# Patient Record
Sex: FEMALE | Race: WHITE | HISPANIC OR LATINO | Employment: STUDENT | URBAN - METROPOLITAN AREA
[De-identification: names, ages, dates, MRNs, and addresses within clinical notes are randomized per-mention and may not be internally consistent; named-entity substitution may affect disease eponyms.]

---
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
## 2024-02-13 ENCOUNTER — TELEPHONE (OUTPATIENT)
Dept: PEDIATRICS | Facility: CLINIC | Age: 19
End: 2024-02-13

## 2024-02-13 ENCOUNTER — OFFICE VISIT (OUTPATIENT)
Dept: PEDIATRICS | Facility: CLINIC | Age: 19
End: 2024-02-13
Payer: COMMERCIAL

## 2024-02-13 VITALS
WEIGHT: 194.45 LBS | DIASTOLIC BLOOD PRESSURE: 75 MMHG | SYSTOLIC BLOOD PRESSURE: 108 MMHG | HEART RATE: 67 BPM | BODY MASS INDEX: 30.52 KG/M2 | HEIGHT: 67 IN

## 2024-02-13 DIAGNOSIS — R45.4 ANGER REACTION: ICD-10-CM

## 2024-02-13 DIAGNOSIS — N92.6 IRREGULAR MENSES: ICD-10-CM

## 2024-02-13 DIAGNOSIS — R73.03 PREDIABETES: ICD-10-CM

## 2024-02-13 DIAGNOSIS — L68.0 HIRSUTISM: ICD-10-CM

## 2024-02-13 DIAGNOSIS — Z60.4 SOCIAL ISOLATION: ICD-10-CM

## 2024-02-13 DIAGNOSIS — E66.811 OBESITY, CLASS I, BMI 30-34.9: Primary | ICD-10-CM

## 2024-02-13 PROCEDURE — 97803 MED NUTRITION INDIV SUBSEQ: CPT | Performed by: DIETITIAN, REGISTERED

## 2024-02-13 PROCEDURE — 99214 OFFICE O/P EST MOD 30 MIN: CPT | Performed by: NURSE PRACTITIONER

## 2024-02-13 RX ORDER — PHENTERMINE HYDROCHLORIDE 30 MG/1
30 CAPSULE ORAL EVERY MORNING
Qty: 30 CAPSULE | Refills: 2 | Status: SHIPPED | OUTPATIENT
Start: 2024-02-13

## 2024-02-13 RX ORDER — METFORMIN HCL 500 MG
500 TABLET, EXTENDED RELEASE 24 HR ORAL 2 TIMES DAILY WITH MEALS
Qty: 180 TABLET | Refills: 0 | Status: SHIPPED | OUTPATIENT
Start: 2024-02-13 | End: 2024-04-23

## 2024-02-13 SDOH — SOCIAL STABILITY - SOCIAL INSECURITY: SOCIAL EXCLUSION AND REJECTION: Z60.4

## 2024-02-13 NOTE — PATIENT INSTRUCTIONS
[facility]   Pediatric Specialty Clinic [place]      Pediatric Call Center Scheduling and Nurse Questions:  [phone]    After hours urgent matters that cannot wait until the next business day:  [phone].  Ask for the on-call pediatric doctor for the specialty you are calling for be paged.      Prescription Renewals:  Please call your pharmacy first.  Your pharmacy must fax requests to [phone].  Please allow 2-3 days for prescriptions to be authorized.    If your physician has ordered a CT or MRI, you may schedule this test by calling [facility] Radiology in [place] at [phone].        **If your child is having a sedated procedure, they will need a history and physical done at their Primary Care Provider within 30 days of the procedure.  If your child was seen by the ordering provider in our office within 30 days of the procedure, their visit summary will work for the H&P unless they inform you otherwise.  If you have any questions, please call the RN Care Coordinator.**

## 2024-02-13 NOTE — NURSING NOTE
"Chief Complaint   Patient presents with    RECHECK     Weight Management       /75 (BP Location: Right arm, Patient Position: Sitting, Cuff Size: Adult Large)   Pulse 67   Ht 1.692 m (5' 6.61\")   Wt 88.2 kg (194 lb 7.1 oz)   BMI 30.81 kg/m      I have Reviewed the patients medications and allergies.    I did not ask about flu vaccine today.    Anmol Moreau, SAMANTA  February 13, 2024    "

## 2024-02-13 NOTE — TELEPHONE ENCOUNTER
PA Initiation    Medication: ZEPBOUND 2.5 MG/0.5ML SC SOAJ  Insurance Company: [employer] - Phone [phone] Fax [phone]  Pharmacy Filling the Rx:    Filling Pharmacy Phone:    Filling Pharmacy Fax:    Start Date: 2/13/2024    Key: [id number]

## 2024-02-13 NOTE — LETTER
2024      RE: Ismael Pepper  [address]  [place] [facility] [address]     Dear Colleague,    Thank you for referring your patient, Ismael Pepper, to the [facility] [place]. Please see a copy of my visit note below.    Date: 2024    PATIENT:  Ismael Pepper  :          2005  CELESTINA:          2024    Dear Roverto Ramírez:    I had the pleasure of seeing your patient, Ismael Pepper, for a follow-up visit in the Pediatric Weight Management Clinic on 2024 at the [facility].  Ismael was last seen in this clinic 2023.  Please see below for my assessment and plan of care.    Intercurrent History:    Ismael was arrived to this appointment by herself.  As you may recall, Ismael is a 18 year old girl with history of class I obesity, sleep onset difficulties, high risk for diabetes and social isolation.  Since Ismael's last visit, Ismael has maintained stable weight. Ismael is in a good place mentally and emotionally.    Ismael is busy with work. She continues work in a  center. Ismael is generally to herself and not very social with friends. Her best friend is leaving for a  year and a half for mission trip and this does give her some anxiety that she will be more isolated.    Ismael has been taking phentermine and metformin. Some days she thinks phentermine is not that effective. Ismael has been taking phentermine for quite some time.      Current Medications:    Current Outpatient Rx   Medication Sig Dispense Refill    ferrous sulfate (FEROSUL) 325 (65 Fe) MG tablet Take 1 tablet (325 mg) by mouth daily (with breakfast) 30 tablet 11    phentermine (ADIPEX-P) 30 MG capsule Take 1 capsule (30 mg) by mouth every morning 30 capsule 2    spironolactone (ALDACTONE) 50 MG tablet TAKE 2 TABLETS BY MOUTH DAILY 180 tablet 1    topiramate (TOPAMAX) 100 MG tablet Take 1 tablet (100 mg) by mouth at  "bedtime for 90 days 90 tablet 0    tretinoin (RETIN-A) 0.05 % external cream Apply small amount to acne prone areas on forehead at bedtime as directed 45 g 1    triamcinolone (KENALOG) 0.1 % external ointment Apply topically 2 times daily To rashes on elbows until resolved 30 g 3    vitamin D3 (CHOLECALCIFEROL) 1.25 MG (96063 UT) capsule Take 1 capsule (50,000 Units) by mouth every 7 days 12 capsule 0    metFORMIN (GLUCOPHAGE XR) 500 MG 24 hr tablet Take 1 tablet (500 mg) by mouth 2 times daily (with meals) for 90 days 180 tablet 0       Physical Exam:    Vitals:  B/P: 108/75, P: 67, R: Data Unavailable   BP:  Blood pressure %suzy are not available for patients who are 18 years or older.    Measured Weights:  Wt Readings from Last 4 Encounters:   02/13/24 88.2 kg (194 lb 7.1 oz) (97%, Z= 1.85)*   12/12/23 89 kg (196 lb 3.4 oz) (97%, Z= 1.89)*   12/12/23 89 kg (196 lb 3.4 oz) (97%, Z= 1.89)*   11/27/23 87.5 kg (192 lb 14.4 oz) (97%, Z= 1.83)*     * Growth percentiles are based on CDC (Girls, 2-20 Years) data.       Height:    Ht Readings from Last 4 Encounters:   02/13/24 1.692 m (5' 6.61\") (82%, Z= 0.91)*   12/12/23 1.69 m (5' 6.54\") (81%, Z= 0.88)*   12/12/23 1.69 m (5' 6.54\") (81%, Z= 0.88)*   11/27/23 1.69 m (5' 6.54\") (81%, Z= 0.88)*     * Growth percentiles are based on CDC (Girls, 2-20 Years) data.       Body Mass Index:  Body mass index is 30.81 kg/m .  Body Mass Index Percentile:  94 %ile (Z= 1.58) based on CDC (Girls, 2-20 Years) BMI-for-age based on BMI available as of 2/13/2024.       Labs:  None today.    Assessment:      Ismael is a 18 year old female with a BMI in the obese category and at risk for weight related co-morbid illness. Today, we discussed continuing current medications. I advised her to go off phentermine for about 10 days. Tolerance develops with phentermine. Stopping for a period of time then restarting may help regain appetite suppression. Ismael would like to consider injectable " medication again. We will add a GLP1-GIP agonist medication Zepbound. GLP1 agonists have been approved for patients 12 and over for the treatment of obesity. In both clinical trials and clinical practice, Juwan has shown dramatic improvement in BMI. We reviewed dosing instructions, benefits/expected outcomes of treatment and possible side-effects. No one in Ismael family has had medullary thyroid cancer or MENS2. Ismael will maintain physical activity by participating in home exercise plan and work which is physical in nature. Ismael will continue regular visits here with me and the dietitian who provides the patient with a reduced calorie diet.  Ismael is not pregnant and has no risk of pregnancy.          I spent a total of 30 minutes on date of encounter face to face with Ismael and family, more than 50% of which was spent in counseling and coordination of care so as to minimize the development and/or progression of obesity related co-morbid conditions.     Ismael s current problem list reviewed today includes:    Encounter Diagnoses   Name Primary?    BMI (body mass index), pediatric, greater than 99% for age     Prediabetes     Hirsutism     Irregular menses     Social isolation     Anger reaction         Care Plan:    Using motivational interviewing, Ismael made the following goals:  Patient Instructions   GOALS  Try matcha vanilla tea latte at [employer]/consider less often/smaller size  Consider bringing a small treat in lunch bag rather than going out for a coffee/tea drink.   Try to find a way to have vegetables once per day as a start.     Hold phentermine for about 10 days, then restart.  Prescription sent for Zepbound.    I am looking forward to seeing Ismael for a follow-up visit in 10 weeks.    Thank you for including me in the care of your patient.  Please do not hesitate to call with questions or concerns.    Sincerely,    Tequila Nunez RN, CPNP  Department of Pediatrics  Pediatric Obesity and Weight  Management Clinic  [facility]      [facility] [phone]  [facility]ty [facility] [place] [phone]    Copy to patient  Kassi Bourne,Manual  [address]  [facility] [address]

## 2024-02-13 NOTE — LETTER
"  2024      RE: Ismael Pepper  [address] [facility] [place] [address]     Dear Colleague,    Thank you for referring your patient, Ismael Pepper, to the [facility]ATR[facility] [place]. Please see a copy of my visit note below.    PATIENT:  Ismael Pepper  :  2005  CELESTINA:  2024  Medical Nutrition Therapy    GOALS  Try matcha vanilla tea latte at [employer]/consider less often/smaller size  Consider bringing a small treat in lunch bag rather than going out for a coffee/tea drink.   Try to find a way to have vegetables once per day as a start.          Nutrition Reassessment  Ismael is a 18 year old year old female who presents to Pediatric Weight Management Clinic with class 1 obesity and prediabetes. Ismael was referred by ANNE Gutierrez, CNP for nutrition education and counseling.    Anthropometrics  Wt Readings from Last 4 Encounters:   23 89 kg (196 lb 3.4 oz) (97%, Z= 1.89)*   23 89 kg (196 lb 3.4 oz) (97%, Z= 1.89)*   23 87.5 kg (192 lb 14.4 oz) (97%, Z= 1.83)*   10/03/23 88 kg (194 lb 0.1 oz) (97%, Z= 1.86)*     * Growth percentiles are based on CDC (Girls, 2-20 Years) data.     Ht Readings from Last 2 Encounters:   23 1.69 m (5' 6.54\") (81%, Z= 0.88)*   23 1.69 m (5' 6.54\") (81%, Z= 0.88)*     * Growth percentiles are based on CDC (Girls, 2-20 Years) data.     Estimated body mass index is 31.16 kg/m  as calculated from the following:    Height as of 23: 1.69 m (5' 6.54\").    Weight as of 23: 89 kg (196 lb 3.4 oz).    Nutrition History  Sometimes has breakfast before work. Will sometimes have eggs with ham. Yesterday she had crepes at home. Mom makes breakfast. Eats breakfast 2-3 times per week.     No morning snack. Drinking water throughout the day.     Will eat lunch during the week days. Packs lunch during the week. Quesadilla with barbacoa and usually bringing fruit. Not as much yogurt. Typically brings leftovers " from what mom makes. Will bring chicken tinga with 1 corn tortilla. Will bring cuties, grapes, strawberries, pineapple.     More of a sweet craving in afternoon - caribou medium matcha bubble tea. Used to have daily chips at work (1 1/2 handfuls). Feeling less hungry no longer craving this as much.     On weekends will go to sleep around 1 am. Waking up around 10 am. Going to sleep between 930-11 pm on work nights. Work shift starts at 9 am.      Protein she likes include: chicken, beef, eggs, nuts (no peanut butter), no seafood  Dairy she likes include: yogurt, milk (doesn't drink often would have a glass with Mexican sweet bread), cheese, cottage cheese is ok  Vegetables she likes include: zucchini, carrots (sometimes), cucumbers (occasionally), salad (would eat it), no to tomatoes, corn, sweet peppers  Fruit she enjoys include: strawberries, bernice, pineapple, banana (sometimes), apples    Nutritional Intakes  Breakfast:        Egg with ham or crepes. 2-3 days per week having breakfast. Water   AM Snack:       None  Lunch:             Barbacoa quesadilla and fruit; caldo de res; tinga with 1 corn tortilla; water  PM Snack:      Snack - 1 1/2 handful potato chips at work (less often); 3-4 days per week medium Ada matcha bubble tea with whip  Dinner:            Carne asada, jennifer, tacos, grilled chicken and rice  HS Snack:       None  Beverages:      Water, matcha tea 3-4 days per week            Dining Out  Ismael eating out less often. Goes out with family on weekends. Sometimes if they don't have food at home she would get [employer] queen salad or [employer] (plate with chow mein, honey sesame chicken and teriyaki chicken with water). Sometimes she saves half for at home or she will eat the whole thing.     Physical Activity   No longer has a treadmill at home. Active with job. [employer] at home. Interested in trying barre, pilates or yoga at home.      Previous Goals & Progress  1) Try to include  vegetables in the evening - most days. - ongoing goal   2) In the evening, spend time with dad/family before snack and then once he starts to snack can go do something else to avoid temptation or grab an herbal tea, water, gum. - goal not addressed  3) Check out beginner yoga, barre or pilates on YouTube. - goal not addressed    Medications/Vitamins/Minerals    Current Outpatient Medications:      ferrous sulfate (FEROSUL) 325 (65 Fe) MG tablet, Take 1 tablet (325 mg) by mouth daily (with breakfast), Disp: 30 tablet, Rfl: 11     metFORMIN (GLUCOPHAGE XR) 500 MG 24 hr tablet, Take 1 tablet (500 mg) by mouth 2 times daily (with meals) for 90 days, Disp: 180 tablet, Rfl: 0     phentermine (ADIPEX-P) 30 MG capsule, Take 1 capsule (30 mg) by mouth every morning, Disp: 30 capsule, Rfl: 2     spironolactone (ALDACTONE) 50 MG tablet, TAKE 2 TABLETS BY MOUTH DAILY, Disp: 180 tablet, Rfl: 1     topiramate (TOPAMAX) 100 MG tablet, Take 1 tablet (100 mg) by mouth at bedtime for 90 days, Disp: 90 tablet, Rfl: 0     tretinoin (RETIN-A) 0.05 % external cream, Apply small amount to acne prone areas on forehead at bedtime as directed, Disp: 45 g, Rfl: 1     triamcinolone (KENALOG) 0.1 % external ointment, Apply topically 2 times daily To rashes on elbows until resolved, Disp: 30 g, Rfl: 3     vitamin D3 (CHOLECALCIFEROL) 1.25 MG (69030 UT) capsule, Take 1 capsule (50,000 Units) by mouth every 7 days, Disp: 12 capsule, Rfl: 0    Nutrition Diagnosis  Obesity related to excessive energy intake as evidenced by BMI/age >95th %ile    Interventions & Education  Provided written and verbal education on the following:    Healthy beverages  Increase fruit and vegetable intake    Monitoring/Evaluation  Will continue to monitor progress towards goals and provide education in Pediatric Weight Management.    Spent 25 minutes in consult with patient.      Again, thank you for allowing me to participate in the care of your patient.       Sincerely,    Magdalena Hoover RD

## 2024-02-13 NOTE — PROGRESS NOTES
Date: 2024    PATIENT:  Ismael Pepper  :          2005  CELESTINA:          2024    Dear Roverto Ramírez:    I had the pleasure of seeing your patient, Ismael Pepper, for a follow-up visit in the Pediatric Weight Management Clinic on 2024 at the [facility].  Ismael was last seen in this clinic 2023.  Please see below for my assessment and plan of care.    Intercurrent History:    Ismael was arrived to this appointment by herself.  As you may recall, Ismael is a 18 year old girl with history of class I obesity, sleep onset difficulties, high risk for diabetes and social isolation.  Since Ismael's last visit, Ismael has maintained stable weight. Ismael is in a good place mentally and emotionally.    Ismael is busy with work. She continues work in a  center. Ismael is generally to herself and not very social with friends. Her best friend is leaving for a  year and a half for mission trip and this does give her some anxiety that she will be more isolated.    Ismael has been taking phentermine and metformin. Some days she thinks phentermine is not that effective. Ismael has been taking phentermine for quite some time.      Current Medications:    Current Outpatient Rx   Medication Sig Dispense Refill    ferrous sulfate (FEROSUL) 325 (65 Fe) MG tablet Take 1 tablet (325 mg) by mouth daily (with breakfast) 30 tablet 11    phentermine (ADIPEX-P) 30 MG capsule Take 1 capsule (30 mg) by mouth every morning 30 capsule 2    spironolactone (ALDACTONE) 50 MG tablet TAKE 2 TABLETS BY MOUTH DAILY 180 tablet 1    topiramate (TOPAMAX) 100 MG tablet Take 1 tablet (100 mg) by mouth at bedtime for 90 days 90 tablet 0    tretinoin (RETIN-A) 0.05 % external cream Apply small amount to acne prone areas on forehead at bedtime as directed 45 g 1    triamcinolone (KENALOG) 0.1 % external ointment Apply topically 2 times daily To rashes on elbows until  "resolved 30 g 3    vitamin D3 (CHOLECALCIFEROL) 1.25 MG (35022 UT) capsule Take 1 capsule (50,000 Units) by mouth every 7 days 12 capsule 0    metFORMIN (GLUCOPHAGE XR) 500 MG 24 hr tablet Take 1 tablet (500 mg) by mouth 2 times daily (with meals) for 90 days 180 tablet 0       Physical Exam:    Vitals:  B/P: 108/75, P: 67, R: Data Unavailable   BP:  Blood pressure %suzy are not available for patients who are 18 years or older.    Measured Weights:  Wt Readings from Last 4 Encounters:   02/13/24 88.2 kg (194 lb 7.1 oz) (97%, Z= 1.85)*   12/12/23 89 kg (196 lb 3.4 oz) (97%, Z= 1.89)*   12/12/23 89 kg (196 lb 3.4 oz) (97%, Z= 1.89)*   11/27/23 87.5 kg (192 lb 14.4 oz) (97%, Z= 1.83)*     * Growth percentiles are based on CDC (Girls, 2-20 Years) data.       Height:    Ht Readings from Last 4 Encounters:   02/13/24 1.692 m (5' 6.61\") (82%, Z= 0.91)*   12/12/23 1.69 m (5' 6.54\") (81%, Z= 0.88)*   12/12/23 1.69 m (5' 6.54\") (81%, Z= 0.88)*   11/27/23 1.69 m (5' 6.54\") (81%, Z= 0.88)*     * Growth percentiles are based on CDC (Girls, 2-20 Years) data.       Body Mass Index:  Body mass index is 30.81 kg/m .  Body Mass Index Percentile:  94 %ile (Z= 1.58) based on CDC (Girls, 2-20 Years) BMI-for-age based on BMI available as of 2/13/2024.       Labs:  None today.    Assessment:      Ismael is a 18 year old female with a BMI in the obese category and at risk for weight related co-morbid illness. Today, we discussed continuing current medications. I advised her to go off phentermine for about 10 days. Tolerance develops with phentermine. Stopping for a period of time then restarting may help regain appetite suppression. Ismael would like to consider injectable medication again. We will add a GLP1-GIP agonist medication Zepbound. GLP1 agonists have been approved for patients 12 and over for the treatment of obesity. In both clinical trials and clinical practice, Wegovy has shown dramatic improvement in BMI. We reviewed dosing " instructions, benefits/expected outcomes of treatment and possible side-effects. No one in Ismael family has had medullary thyroid cancer or MENS2. Ismael will maintain physical activity by participating in home exercise plan and work which is physical in nature. Ismael will continue regular visits here with me and the dietitian who provides the patient with a reduced calorie diet.  Ismael is not pregnant and has no risk of pregnancy.          I spent a total of 30 minutes on date of encounter face to face with Ismael and family, more than 50% of which was spent in counseling and coordination of care so as to minimize the development and/or progression of obesity related co-morbid conditions.     Ismael s current problem list reviewed today includes:    Encounter Diagnoses   Name Primary?    BMI (body mass index), pediatric, greater than 99% for age     Prediabetes     Hirsutism     Irregular menses     Social isolation     Anger reaction         Care Plan:    Using motivational interviewing, Ismael made the following goals:  Patient Instructions   GOALS  Try matcha vanilla tea latte at [place]/consider less often/smaller size  Consider bringing a small treat in lunch bag rather than going out for a coffee/tea drink.   Try to find a way to have vegetables once per day as a start.     Hold phentermine for about 10 days, then restart.  Prescription sent for Zepbound.    I am looking forward to seeing Ismael for a follow-up visit in 10 weeks.    Thank you for including me in the care of your patient.  Please do not hesitate to call with questions or concerns.    Sincerely,    Tequila Nunez RN, CPNP  Department of Pediatrics  Pediatric Obesity and Weight Management Clinic  [facility] [phone]  Specialty [facility] [place] [phone]      CC  Copy to patient  BourneKassi,Manual  [address]  [facility] [address]

## 2024-02-13 NOTE — PATIENT INSTRUCTIONS
GOALS  Try matcha vanilla tea latte at [place]/consider less often/smaller size  Consider bringing a small treat in lunch bag rather than going out for a coffee/tea drink.   Try to find a way to have vegetables once per day as a start.

## 2024-02-13 NOTE — PROGRESS NOTES
"PATIENT:  Ismael Pepper  :  2005  CELESTINA:  2024  Medical Nutrition Therapy    GOALS  Try matcha vanilla tea latte at [place]/consider less often/smaller size  Consider bringing a small treat in lunch bag rather than going out for a coffee/tea drink.   Try to find a way to have vegetables once per day as a start.          Nutrition Reassessment  Ismael is a 18 year old year old female who presents to Pediatric Weight Management Clinic with class 1 obesity and prediabetes. Ismael was referred by ANNE Gutierrez, CNP for nutrition education and counseling.    Anthropometrics  Wt Readings from Last 4 Encounters:   23 89 kg (196 lb 3.4 oz) (97%, Z= 1.89)*   23 89 kg (196 lb 3.4 oz) (97%, Z= 1.89)*   23 87.5 kg (192 lb 14.4 oz) (97%, Z= 1.83)*   10/03/23 88 kg (194 lb 0.1 oz) (97%, Z= 1.86)*     * Growth percentiles are based on CDC (Girls, 2-20 Years) data.     Ht Readings from Last 2 Encounters:   23 1.69 m (5' 6.54\") (81%, Z= 0.88)*   23 1.69 m (5' 6.54\") (81%, Z= 0.88)*     * Growth percentiles are based on CDC (Girls, 2-20 Years) data.     Estimated body mass index is 31.16 kg/m  as calculated from the following:    Height as of 23: 1.69 m (5' 6.54\").    Weight as of 23: 89 kg (196 lb 3.4 oz).    Nutrition History  Sometimes has breakfast before work. Will sometimes have eggs with ham. Yesterday she had crepes at home. Mom makes breakfast. Eats breakfast 2-3 times per week.     No morning snack. Drinking water throughout the day.     Will eat lunch during the week days. Packs lunch during the week. Quesadilla with barbacoa and usually bringing fruit. Not as much yogurt. Typically brings leftovers from what mom makes. Will bring chicken tinga with 1 corn tortilla. Will bring cuties, grapes, strawberries, pineapple.     More of a sweet craving in afternoon - caribou medium matcha bubble tea. Used to have daily chips at work (1 1/2 handfuls). Feeling less " hungry no longer craving this as much.     On weekends will go to sleep around 1 am. Waking up around 10 am. Going to sleep between 930-11 pm on work nights. Work shift starts at 9 am.      Protein she likes include: chicken, beef, eggs, nuts (no peanut butter), no seafood  Dairy she likes include: yogurt, milk (doesn't drink often would have a glass with Mexican sweet bread), cheese, cottage cheese is ok  Vegetables she likes include: zucchini, carrots (sometimes), cucumbers (occasionally), salad (would eat it), no to tomatoes, corn, sweet peppers  Fruit she enjoys include: strawberries, bernice, pineapple, banana (sometimes), apples    Nutritional Intakes  Breakfast:        Egg with ham or crepes. 2-3 days per week having breakfast. Water   AM Snack:       None  Lunch:             Barbacoa quesadilla and fruit; caldo de res; tinga with 1 corn tortilla; water  PM Snack:      Snack - 1 1/2 handful potato chips at work (less often); 3-4 days per week medium [place] matcha bubble tea with whip  Dinner:            Carne asada, jennifer, tacos, grilled chicken and rice  HS Snack:       None  Beverages:      Water, matcha tea 3-4 days per week            Dining Out  Ismael eating out less often. Goes out with family on weekends. Sometimes if they don't have food at home she would get Chick[employer]Chandrak[employer] queen salad or [employer] (plate with chow mein, honey sesame chicken and teriyaki chicken with water). Sometimes she saves half for at home or she will eat the whole thing.     Physical Activity   No longer has a treadmill at home. Active with job. [employer] at home. Interested in trying barre, pilates or yoga at home.      Previous Goals & Progress  1) Try to include vegetables in the evening - most days. - ongoing goal   2) In the evening, spend time with dad/family before snack and then once he starts to snack can go do something else to avoid temptation or grab an herbal tea, water, gum. - goal not addressed  3) Check out  beginner yoga, barre or pilates on YouTube. - goal not addressed    Medications/Vitamins/Minerals    Current Outpatient Medications:     ferrous sulfate (FEROSUL) 325 (65 Fe) MG tablet, Take 1 tablet (325 mg) by mouth daily (with breakfast), Disp: 30 tablet, Rfl: 11    metFORMIN (GLUCOPHAGE XR) 500 MG 24 hr tablet, Take 1 tablet (500 mg) by mouth 2 times daily (with meals) for 90 days, Disp: 180 tablet, Rfl: 0    phentermine (ADIPEX-P) 30 MG capsule, Take 1 capsule (30 mg) by mouth every morning, Disp: 30 capsule, Rfl: 2    spironolactone (ALDACTONE) 50 MG tablet, TAKE 2 TABLETS BY MOUTH DAILY, Disp: 180 tablet, Rfl: 1    topiramate (TOPAMAX) 100 MG tablet, Take 1 tablet (100 mg) by mouth at bedtime for 90 days, Disp: 90 tablet, Rfl: 0    tretinoin (RETIN-A) 0.05 % external cream, Apply small amount to acne prone areas on forehead at bedtime as directed, Disp: 45 g, Rfl: 1    triamcinolone (KENALOG) 0.1 % external ointment, Apply topically 2 times daily To rashes on elbows until resolved, Disp: 30 g, Rfl: 3    vitamin D3 (CHOLECALCIFEROL) 1.25 MG (77899 UT) capsule, Take 1 capsule (50,000 Units) by mouth every 7 days, Disp: 12 capsule, Rfl: 0    Nutrition Diagnosis  Obesity related to excessive energy intake as evidenced by BMI/age >95th %ile    Interventions & Education  Provided written and verbal education on the following:    Healthy beverages  Increase fruit and vegetable intake    Monitoring/Evaluation  Will continue to monitor progress towards goals and provide education in Pediatric Weight Management.    Spent 25 minutes in consult with patient.

## 2024-02-14 NOTE — TELEPHONE ENCOUNTER
Prior Authorization Approval    Medication: ZEPBOUND 2.5 MG/0.5ML SC SOAJ  Authorization Effective Date: 2/14/2024  Authorization Expiration Date: 8/14/2024  Approved Dose/Quantity: 2ml/28 days   Reference #: [id number]   Insurance Company: [employer] - Phone [phone] Fax [phone]  Expected CoPay: $ 0  CoPay Card Available:      Financial Assistance Needed: no  Which Pharmacy is filling the prescription:    Pharmacy Notified: no  Patient Notified: pharmacy will notify patient

## 2024-02-25 ENCOUNTER — HEALTH MAINTENANCE LETTER (OUTPATIENT)
Age: 19
End: 2024-02-25

## 2024-04-23 ENCOUNTER — OFFICE VISIT (OUTPATIENT)
Dept: PEDIATRICS | Facility: CLINIC | Age: 19
End: 2024-04-23
Payer: COMMERCIAL

## 2024-04-23 VITALS
WEIGHT: 202.38 LBS | BODY MASS INDEX: 31.76 KG/M2 | HEIGHT: 67 IN | HEART RATE: 65 BPM | DIASTOLIC BLOOD PRESSURE: 76 MMHG | SYSTOLIC BLOOD PRESSURE: 126 MMHG

## 2024-04-23 VITALS
BODY MASS INDEX: 31.76 KG/M2 | WEIGHT: 202.38 LBS | HEART RATE: 65 BPM | HEIGHT: 67 IN | SYSTOLIC BLOOD PRESSURE: 126 MMHG | DIASTOLIC BLOOD PRESSURE: 76 MMHG

## 2024-04-23 DIAGNOSIS — R45.4 ANGER REACTION: ICD-10-CM

## 2024-04-23 DIAGNOSIS — E66.811 OBESITY, CLASS I, BMI 30-34.9: Primary | ICD-10-CM

## 2024-04-23 DIAGNOSIS — N92.6 IRREGULAR MENSES: ICD-10-CM

## 2024-04-23 DIAGNOSIS — L68.0 HIRSUTISM: ICD-10-CM

## 2024-04-23 DIAGNOSIS — L64.9 ANDROGENIC ALOPECIA: ICD-10-CM

## 2024-04-23 DIAGNOSIS — R73.03 PREDIABETES: ICD-10-CM

## 2024-04-23 DIAGNOSIS — E55.9 VITAMIN D DEFICIENCY: ICD-10-CM

## 2024-04-23 DIAGNOSIS — Z01.01 FAILED VISION SCREEN: ICD-10-CM

## 2024-04-23 DIAGNOSIS — Z60.4 SOCIAL ISOLATION: ICD-10-CM

## 2024-04-23 PROCEDURE — 99214 OFFICE O/P EST MOD 30 MIN: CPT | Performed by: NURSE PRACTITIONER

## 2024-04-23 PROCEDURE — 85246 CLOT FACTOR VIII VW ANTIGEN: CPT | Mod: 90 | Performed by: NURSE PRACTITIONER

## 2024-04-23 PROCEDURE — 36415 COLL VENOUS BLD VENIPUNCTURE: CPT | Performed by: NURSE PRACTITIONER

## 2024-04-23 PROCEDURE — 85245 CLOT FACTOR VIII VW RISTOCTN: CPT | Mod: 90 | Performed by: NURSE PRACTITIONER

## 2024-04-23 PROCEDURE — 99000 SPECIMEN HANDLING OFFICE-LAB: CPT | Performed by: NURSE PRACTITIONER

## 2024-04-23 PROCEDURE — 97803 MED NUTRITION INDIV SUBSEQ: CPT | Performed by: DIETITIAN, REGISTERED

## 2024-04-23 PROCEDURE — 85390 FIBRINOLYSINS SCREEN I&R: CPT | Performed by: PATHOLOGY

## 2024-04-23 PROCEDURE — 85247 CLOT FACTOR VIII MULTIMETRIC: CPT | Mod: 90 | Performed by: NURSE PRACTITIONER

## 2024-04-23 PROCEDURE — 85240 CLOT FACTOR VIII AHG 1 STAGE: CPT | Mod: 90 | Performed by: NURSE PRACTITIONER

## 2024-04-23 RX ORDER — METFORMIN HCL 500 MG
500 TABLET, EXTENDED RELEASE 24 HR ORAL
Qty: 180 TABLET | Refills: 0 | Status: SHIPPED | OUTPATIENT
Start: 2024-04-23 | End: 2024-06-17

## 2024-04-23 SDOH — SOCIAL STABILITY - SOCIAL INSECURITY: SOCIAL EXCLUSION AND REJECTION: Z60.4

## 2024-04-23 ASSESSMENT — PAIN SCALES - GENERAL
PAINLEVEL: NO PAIN (0)
PAINLEVEL: NO PAIN (0)

## 2024-04-23 NOTE — PATIENT INSTRUCTIONS
GOALS  Set alarm for morning metformin at 800 am.  Try the  Mitchell's Sparkling Auguste with no added sugar/sweetener.   In the morning, try to have some sort of protein: boiled eggs with fruit, Greek yogurt or cottage cheese with fruit, small handful of nuts with fruit, avocado toast with everything bagel seasoning +/- egg  Try to increase physical activity. Go for walks or use your stair stepper, biking etc. Start with 3 days per week.    [facility]   Pediatric Specialty Clinic [place]      Pediatric Call Center Scheduling and Nurse Questions:  [phone]    After hours urgent matters that cannot wait until the next business day:  [phone].  Ask for the on-call pediatric doctor for the specialty you are calling for be paged.      Prescription Renewals:  Please call your pharmacy first.  Your pharmacy must fax requests to [phone].  Please allow 2-3 days for prescriptions to be authorized.    If your physician has ordered a CT or MRI, you may schedule this test by calling [facility] in [place] at [phone].        **If your child is having a sedated procedure, they will need a history and physical done at their Primary Care Provider within 30 days of the procedure.  If your child was seen by the ordering provider in our office within 30 days of the procedure, their visit summary will work for the H&P unless they inform you otherwise.  If you have any questions, please call the RN Care Coordinator.**

## 2024-04-23 NOTE — LETTER
2024      RE: Ismael Pepper  [address]  [facility] [address]     Dear Colleague,    Thank you for referring your patient, Ismael Pepper, to the [facility] [place]. Please see a copy of my visit note below.    Date: 2024    PATIENT:  Ismael Pepper  :          2005  CELESTINA:          2024    Dear Roverto Ramírez:    I had the pleasure of seeing your patient, Ismael Pepper, for a follow-up visit in the Pediatric Weight Management Clinic on 2024 at the [facility].  Ismael was last seen in this clinic 2024.  Please see below for my assessment and plan of care.    Intercurrent History:    Ismael was arrive to this appointment by herself.  As you may recall, Ismael is a 18 year old girl with history of class I obesity, PCOS symptoms and high risk for diabetes.  Since Ismael last visit, Ismael has gained 8 pounds. She is tearful in clinic today about weight gain. Ismael had started Zepbound and was getting some bruising at injection site and then stopped. Ismael has a hard time with actually doing the injections and gets quite nervous about it. She is trying to be better with taking oral medication (metformin).      Current Medications:    Current Outpatient Rx   Medication Sig Dispense Refill     ferrous sulfate (FEROSUL) 325 (65 Fe) MG tablet Take 1 tablet (325 mg) by mouth daily (with breakfast) 30 tablet 11     metFORMIN (GLUCOPHAGE XR) 500 MG 24 hr tablet Take 1 tablet (500 mg) by mouth 2 times daily (with meals) 180 tablet 0     phentermine 30 MG capsule Take 1 capsule (30 mg) by mouth every morning 30 capsule 2     spironolactone (ALDACTONE) 50 MG tablet TAKE 2 TABLETS BY MOUTH DAILY 180 tablet 1     topiramate (TOPAMAX) 100 MG tablet Take 1 tablet (100 mg) by mouth at bedtime for 90 days 90 tablet 0     tretinoin (RETIN-A) 0.05 % external cream Apply small amount to acne prone  "areas on forehead at bedtime as directed 45 g 1     triamcinolone (KENALOG) 0.1 % external ointment Apply topically 2 times daily To rashes on elbows until resolved 30 g 3     vitamin D3 (CHOLECALCIFEROL) 1.25 MG (65714 UT) capsule Take 1 capsule (50,000 Units) by mouth every 7 days 12 capsule 0       Physical Exam:    Vitals:  B/P: 126/76, P: 65, R: Data Unavailable   BP:  Blood pressure %suzy are not available for patients who are 18 years or older.    Measured Weights:  Wt Readings from Last 4 Encounters:   04/23/24 91.8 kg (202 lb 6.1 oz) (98%, Z= 1.98)*   04/23/24 91.8 kg (202 lb 6.1 oz) (98%, Z= 1.98)*   02/13/24 88.2 kg (194 lb 7.1 oz) (97%, Z= 1.85)*   12/12/23 89 kg (196 lb 3.4 oz) (97%, Z= 1.89)*     * Growth percentiles are based on CDC (Girls, 2-20 Years) data.       Height:    Ht Readings from Last 4 Encounters:   04/23/24 1.692 m (5' 6.61\") (82%, Z= 0.91)*   04/23/24 1.692 m (5' 6.61\") (82%, Z= 0.91)*   02/13/24 1.692 m (5' 6.61\") (82%, Z= 0.91)*   12/12/23 1.69 m (5' 6.54\") (81%, Z= 0.88)*     * Growth percentiles are based on CDC (Girls, 2-20 Years) data.       Body Mass Index:  Body mass index is 32.07 kg/m .  Body Mass Index Percentile:  95 %ile (Z= 1.66) based on CDC (Girls, 2-20 Years) BMI-for-age based on BMI available as of 4/23/2024.       Labs:  Pending    Assessment:      Ismael is a 18 year old female with a BMI in the obese category and at risk for weight related co-morbid illness. Today, we discussed checking Von Willebrand panel to rule out any clotting issues. In addition to bruising, Ismael also has epistaxis and bruises easy elsewhere on her body in non-typical places.    I encouraged her to continue healthy choices and take metformin. If she wants to restart a GLP1, we can discuss at future visit.       I spent a total of 30 minutes on date of encounter face to face with Ismael and family, more than 50% of which was spent in counseling and coordination of care so as to minimize the " development and/or progression of obesity related co-morbid conditions.     Ismael s current problem list reviewed today includes:    Encounter Diagnoses   Name Primary?     BMI (body mass index), pediatric, greater than 99% for age Yes     Vitamin D deficiency      Prediabetes      Hirsutism      Androgenic alopecia      Irregular menses      Social isolation      Anger reaction      Failed vision screen         Care Plan:    Using motivational interviewing, Ismael made the following goals:  Continue metformin.  Try food log to monitor calories.      I am looking forward to seeing Ismael for a follow-up visit in 3 months.    Thank you for including me in the care of your patient.  Please do not hesitate to call with questions or concerns.    Sincerely,    Tequila Nunez, RN, CPNP  Department of Pediatrics  Pediatric Obesity and Weight Management Clinic  [facility] [phone]  [facility], [place] [phone]      CC  Copy to patient  Kassi Bourne,Manual  [address]  [facility] [address]

## 2024-04-23 NOTE — NURSING NOTE
"[facility] [[id number]]  Chief Complaint   Patient presents with    Nutrition Counseling     Initial /76   Pulse 65   Ht 1.692 m (5' 6.61\")   Wt 91.8 kg (202 lb 6.1 oz)   BMI 32.07 kg/m   Estimated body mass index is 32.07 kg/m  as calculated from the following:    Height as of this encounter: 1.692 m (5' 6.61\").    Weight as of this encounter: 91.8 kg (202 lb 6.1 oz).  Medication Reconciliation: complete      Does the patient want a flu shot today? No          "

## 2024-04-23 NOTE — LETTER
"  2024      RE: Ismael Pepper  [address] [place] [address]     Dear Colleague,    Thank you for referring your patient, Ismael Pepper, to the [facility] [place]. Please see a copy of my visit note below.    PATIENT:  Ismael Pepper  :  2005  CELESTINA:  2024  Medical Nutrition Therapy    GOALS  Set alarm for morning metformin at 800 am.  Try the  Mitchell's Sparkling Auguste with no added sugar/sweetener.   In the morning, try to have some sort of protein: boiled eggs with fruit, Greek yogurt or cottage cheese with fruit, small handful of nuts with fruit, avocado toast with everything bagel seasoning +/- egg  Try to increase physical activity. Go for walks or use your stair stepper, biking etc. Start with 3 days per week.          Nutrition Reassessment  Ismael is a 18 year old year old female who presents to [facility] with class 1 obesity and prediabetes. Ismael was referred by ANNE Gutierrez, CNP for nutrition education and counseling.    Anthropometrics  Wt Readings from Last 4 Encounters:   24 91.8 kg (202 lb 6.1 oz) (98%, Z= 1.98)*   24 88.2 kg (194 lb 7.1 oz) (97%, Z= 1.85)*   23 89 kg (196 lb 3.4 oz) (97%, Z= 1.89)*   23 89 kg (196 lb 3.4 oz) (97%, Z= 1.89)*     * Growth percentiles are based on CDC (Girls, 2-20 Years) data.     Ht Readings from Last 2 Encounters:   24 1.692 m (5' 6.61\") (82%, Z= 0.91)*   24 1.692 m (5' 6.61\") (82%, Z= 0.91)*     * Growth percentiles are based on CDC (Girls, 2-20 Years) data.     Estimated body mass index is 32.07 kg/m  as calculated from the following:    Height as of this encounter: 1.692 m (5' 6.61\").    Weight as of this encounter: 91.8 kg (202 lb 6.1 oz).    Nutrition History  More thirsty on Zepbound. Didn't notice much of a difference in appetite. Having some small bruising on her stomach at injection sites.     Taking Topiramate 100 mg in " the evening - takes consistently. She takes 1500 mg Metformin in the morning. Forgets this regularly because rushing around.     Waking up around 8 am.     Trying to do breakfast in the morning. She will have something quick like sourdough bread with homemade strawberry jelly. Sometimes will put sweetened condensed milk on top. 1 slice most days. Will have matcha she makes at home. There is some sweetness in the powder. 100 kcals or 18-22 grams sugar. Not going out for matcha as much anymore. Will get coffee house drinks 1 time every two weeks. Only hungry sometimes in the morning. Feeling more hungry at work.      No morning snack. Drinking water throughout the day.      Will eat lunch during the week days. Packs lunch during the week. Had been doing more takeout but has recently decreased this. Packing spring rolls with chicken (4 large). She might bring leftovers from dinner - asparagus with fried rice (1 fist) and baked chicken wings (3-4). Had been eating more snacks at work but has been decreasing this. She had been bringing fruit just in case she was hungry. There is a snack cupboard for staff with chips etc. Ismael is no longer picking at the staff snacks. She brings  [employer] takis. Fills a snack baggie filled with these. Bringing some of the time.     Having more salty cravings lately. Got a package of baked chips. She will have these or the takis 3-4 times per week.     Appetite is variable at dinner. Ex) mom has been making cauliflower rice with chicken wings. Mom is making tacos tonight (3).    Yesterday before going to sleep she had lowfat cottage cheese with jelly. Was craving yogurt but didn't have that.       On weekends will go to sleep around 1 am. Waking up around 10 am. Going to sleep between 930-11 pm on work nights. Work shift starts at 9 am.      Protein she likes include: chicken, beef, eggs, nuts (no peanut butter), no seafood  Dairy she likes include: yogurt, milk (doesn't drink often  would have a glass with Mexican sweet bread), cheese, cottage cheese is ok  Vegetables she likes include: zucchini, carrots (sometimes), cucumbers (occasionally), salad (would eat it), no to tomatoes, corn, sweet peppers  Fruit she enjoys include: strawberries, bernice, pineapple, banana (sometimes), apples     Nutritional Intakes  Breakfast:        Sourdough with jelly and sweetened condensed milk; matcha tea at home   AM Snack:       None  Lunch:             Barbacoa quesadilla and fruit; caldo de res; tinga with 1 corn tortilla; water  PM Snack:      Snack - snack size bag of  agustina's takis or individual size baked chips  Dinner:            jennifer Philip tacos (3), grilled chicken and rice  HS Snack:       Cottage cheese with small amount of jam  Beverages:      Water, matcha tea (homemade) 3-4 days per week, strawberry rhubarb soda from "[employer] (60 kcals and 13 g sugar; 1-2 times per week)          Dining Out  Ismael eating out less often. Goes out with family on weekends. Sometimes if they don't have food at home she would get Chick-Chandrakant-A queen salad or [employer] (plate with chow mein, honey sesame chicken and teriyaki chicken with water). Sometimes she saves half for at home or she will eat the whole thing.     Physical Activity   Active with job. [employer] at home. Interested in trying barre, pilates or yoga at home. Bought a stair stepper.     Previous Goals & Progress  Try matcha vanilla tea latte at [place]/consider less often/smaller size - going well. Now doing matcha at home   Consider bringing a small treat in lunch bag rather than going out for a coffee/tea drink. - not getting coffee drinks as much  Try to find a way to have vegetables once per day as a start. - goal not addressed    Medications/Vitamins/Minerals    Current Outpatient Medications:      ferrous sulfate (FEROSUL) 325 (65 Fe) MG tablet, Take 1 tablet (325 mg) by mouth daily (with breakfast), Disp: 30 tablet, Rfl:  11     metFORMIN (GLUCOPHAGE XR) 500 MG 24 hr tablet, Take 1 tablet (500 mg) by mouth 2 times daily (with meals), Disp: 180 tablet, Rfl: 0     phentermine 30 MG capsule, Take 1 capsule (30 mg) by mouth every morning, Disp: 30 capsule, Rfl: 2     spironolactone (ALDACTONE) 50 MG tablet, TAKE 2 TABLETS BY MOUTH DAILY, Disp: 180 tablet, Rfl: 1     tretinoin (RETIN-A) 0.05 % external cream, Apply small amount to acne prone areas on forehead at bedtime as directed, Disp: 45 g, Rfl: 1     triamcinolone (KENALOG) 0.1 % external ointment, Apply topically 2 times daily To rashes on elbows until resolved, Disp: 30 g, Rfl: 3     vitamin D3 (CHOLECALCIFEROL) 1.25 MG (54715 UT) capsule, Take 1 capsule (50,000 Units) by mouth every 7 days, Disp: 12 capsule, Rfl: 0     topiramate (TOPAMAX) 100 MG tablet, Take 1 tablet (100 mg) by mouth at bedtime for 90 days, Disp: 90 tablet, Rfl: 0    Nutrition Diagnosis  Obesity related to excessive energy intake as evidenced by BMI/age >95th %ile    Interventions & Education  Provided written and verbal education on the following:    Healthy meals/cooking  Healthy snacks  Healthy beverages  Increase fruit and vegetable intake  Physical activity  Med adherence    Monitoring/Evaluation  Will continue to monitor progress towards goals and provide education in Pediatric Weight Management.    Spent 30 minutes in consult with patient.      Again, thank you for allowing me to participate in the care of your patient.      Sincerely,    Magdalena Hoover RD

## 2024-04-23 NOTE — NURSING NOTE
"[facility] [[id number]]  Chief Complaint   Patient presents with    Follow Up     Weight management     Initial /76 (BP Location: Right arm, Patient Position: Sitting, Cuff Size: Adult Regular)   Pulse 65   Ht 1.692 m (5' 6.61\")   Wt 91.8 kg (202 lb 6.1 oz)   BMI 32.07 kg/m   Estimated body mass index is 32.07 kg/m  as calculated from the following:    Height as of this encounter: 1.692 m (5' 6.61\").    Weight as of this encounter: 91.8 kg (202 lb 6.1 oz).  Medication Reconciliation: complete      Does the patient want a flu shot today? No          "

## 2024-04-23 NOTE — PROGRESS NOTES
Date: 2024    PATIENT:  Ismael Pepper  :          2005  CELESTINA:          2024    Dear Roverto Ramírez:    I had the pleasure of seeing your patient, Ismael Pepper, for a follow-up visit in the Pediatric [facility] Management Clinic on 2024 at the [facility].  Ismael was last seen in this clinic 2024.  Please see below for my assessment and plan of care.    Intercurrent History:    Ismael was arrive to this appointment by herself.  As you may recall, Ismael is a 18 year old girl with history of class I obesity, PCOS symptoms and high risk for diabetes.  Since Ismael last visit, Ismael has gained 8 pounds. She is tearful in clinic today about weight gain. Ismael had started Zepbound and was getting some bruising at injection site and then stopped. Ismael has a hard time with actually doing the injections and gets quite nervous about it. She is trying to be better with taking oral medication (metformin).      Current Medications:    Current Outpatient Rx   Medication Sig Dispense Refill    ferrous sulfate (FEROSUL) 325 (65 Fe) MG tablet Take 1 tablet (325 mg) by mouth daily (with breakfast) 30 tablet 11    metFORMIN (GLUCOPHAGE XR) 500 MG 24 hr tablet Take 1 tablet (500 mg) by mouth 2 times daily (with meals) 180 tablet 0    phentermine 30 MG capsule Take 1 capsule (30 mg) by mouth every morning 30 capsule 2    spironolactone (ALDACTONE) 50 MG tablet TAKE 2 TABLETS BY MOUTH DAILY 180 tablet 1    topiramate (TOPAMAX) 100 MG tablet Take 1 tablet (100 mg) by mouth at bedtime for 90 days 90 tablet 0    tretinoin (RETIN-A) 0.05 % external cream Apply small amount to acne prone areas on forehead at bedtime as directed 45 g 1    triamcinolone (KENALOG) 0.1 % external ointment Apply topically 2 times daily To rashes on elbows until resolved 30 g 3    vitamin D3 (CHOLECALCIFEROL) 1.25 MG (75533 UT) capsule Take 1 capsule (50,000 Units) by mouth  "every 7 days 12 capsule 0       Physical Exam:    Vitals:  B/P: 126/76, P: 65, R: Data Unavailable   BP:  Blood pressure %suzy are not available for patients who are 18 years or older.    Measured Weights:  Wt Readings from Last 4 Encounters:   04/23/24 91.8 kg (202 lb 6.1 oz) (98%, Z= 1.98)*   04/23/24 91.8 kg (202 lb 6.1 oz) (98%, Z= 1.98)*   02/13/24 88.2 kg (194 lb 7.1 oz) (97%, Z= 1.85)*   12/12/23 89 kg (196 lb 3.4 oz) (97%, Z= 1.89)*     * Growth percentiles are based on CDC (Girls, 2-20 Years) data.       Height:    Ht Readings from Last 4 Encounters:   04/23/24 1.692 m (5' 6.61\") (82%, Z= 0.91)*   04/23/24 1.692 m (5' 6.61\") (82%, Z= 0.91)*   02/13/24 1.692 m (5' 6.61\") (82%, Z= 0.91)*   12/12/23 1.69 m (5' 6.54\") (81%, Z= 0.88)*     * Growth percentiles are based on CDC (Girls, 2-20 Years) data.       Body Mass Index:  Body mass index is 32.07 kg/m .  Body Mass Index Percentile:  95 %ile (Z= 1.66) based on CDC (Girls, 2-20 Years) BMI-for-age based on BMI available as of 4/23/2024.       Labs:  Pending    Assessment:      Ismael is a 18 year old female with a BMI in the obese category and at risk for weight related co-morbid illness. Today, we discussed checking Von Willebrand panel to rule out any clotting issues. In addition to bruising, Ismael also has epistaxis and bruises easy elsewhere on her body in non-typical places.    I encouraged her to continue healthy choices and take metformin. If she wants to restart a GLP1, we can discuss at future visit.       I spent a total of 30 minutes on date of encounter face to face with Ismael and family, more than 50% of which was spent in counseling and coordination of care so as to minimize the development and/or progression of obesity related co-morbid conditions.     Ismael s current problem list reviewed today includes:    Encounter Diagnoses   Name Primary?    BMI (body mass index), pediatric, greater than 99% for age Yes    Vitamin D deficiency     " Prediabetes     Hirsutism     Androgenic alopecia     Irregular menses     Social isolation     Anger reaction     Failed vision screen         Care Plan:    Using motivational interviewing, Ismael made the following goals:  Continue metformin.  Try food log to monitor calories.      I am looking forward to seeing Ismael for a follow-up visit in 3 months.    Thank you for including me in the care of your patient.  Please do not hesitate to call with questions or concerns.    Sincerely,    Tequila Nunez RN, CPNP  Department of Pediatrics  Pediatric Obesity and Weight Management Clinic  [facility] [phone]  [facility] [place] [phone]      CC  Copy to patient  Kassi Bourne,Manual  [address]  [facility] [address]

## 2024-04-23 NOTE — PROGRESS NOTES
"PATIENT:  Ismael Pepper  :  2005  CELESTINA:  2024  Medical Nutrition Therapy    GOALS  Set alarm for morning metformin at 800 am.  Try the  Mitchell's Sparkling Auguste with no added sugar/sweetener.   In the morning, try to have some sort of protein: boiled eggs with fruit, Greek yogurt or cottage cheese with fruit, small handful of nuts with fruit, avocado toast with everything bagel seasoning +/- egg  Try to increase physical activity. Go for walks or use your stair stepper, biking etc. Start with 3 days per week.          Nutrition Reassessment  Ismael is a 18 year old year old female who presents to Pediatric [facility] Management Clinic with class 1 obesity and prediabetes. Ismael was referred by ANNE Gutierrez CNP for nutrition education and counseling.    Anthropometrics  Wt Readings from Last 4 Encounters:   24 91.8 kg (202 lb 6.1 oz) (98%, Z= 1.98)*   24 88.2 kg (194 lb 7.1 oz) (97%, Z= 1.85)*   23 89 kg (196 lb 3.4 oz) (97%, Z= 1.89)*   23 89 kg (196 lb 3.4 oz) (97%, Z= 1.89)*     * Growth percentiles are based on CDC (Girls, 2-20 Years) data.     Ht Readings from Last 2 Encounters:   24 1.692 m (5' 6.61\") (82%, Z= 0.91)*   24 1.692 m (5' 6.61\") (82%, Z= 0.91)*     * Growth percentiles are based on CDC (Girls, 2-20 Years) data.     Estimated body mass index is 32.07 kg/m  as calculated from the following:    Height as of this encounter: 1.692 m (5' 6.61\").    Weight as of this encounter: 91.8 kg (202 lb 6.1 oz).    Nutrition History  More thirsty on Zepbound. Didn't notice much of a difference in appetite. Having some small bruising on her stomach at injection sites.     Taking Topiramate 100 mg in the evening - takes consistently. She takes 1500 mg Metformin in the morning. Forgets this regularly because rushing around.     Waking up around 8 am.     Trying to do breakfast in the morning. She will have something quick like sourdough bread with homemade " strawberry jelly. Sometimes will put sweetened condensed milk on top. 1 slice most days. Will have matcha she makes at home. There is some sweetness in the powder. 100 kcals or 18-22 grams sugar. Not going out for matcha as much anymore. Will get coffee house drinks 1 time every two weeks. Only hungry sometimes in the morning. Feeling more hungry at work.      No morning snack. Drinking water throughout the day.      Will eat lunch during the week days. Packs lunch during the week. Had been doing more takeout but has recently decreased this. Packing spring rolls with chicken (4 large). She might bring leftovers from dinner - asparagus with fried rice (1 fist) and baked chicken wings (3-4). Had been eating more snacks at work but has been decreasing this. She had been bringing fruit just in case she was hungry. There is a snack cupboard for staff with chips etc. Ismael is no longer picking at the staff snacks. She brings  [employer] takis. Fills a snack baggie filled with these. Bringing some of the time.     Having more salty cravings lately. Got a package of baked chips. She will have these or the takis 3-4 times per week.     Appetite is variable at dinner. Ex) mom has been making cauliflower rice with chicken wings. Mom is making tacos tonight (3).    Yesterday before going to sleep she had lowfat cottage cheese with jelly. Was craving yogurt but didn't have that.       On weekends will go to sleep around 1 am. Waking up around 10 am. Going to sleep between 930-11 pm on work nights. Work shift starts at 9 am.      Protein she likes include: chicken, beef, eggs, nuts (no peanut butter), no seafood  Dairy she likes include: yogurt, milk (doesn't drink often would have a glass with Mexican sweet bread), cheese, cottage cheese is ok  Vegetables she likes include: zucchini, carrots (sometimes), cucumbers (occasionally), salad (would eat it), no to tomatoes, corn, sweet peppers  Fruit she enjoys include: strawberries,  bernice, pineapple, banana (sometimes), apples     Nutritional Intakes  Breakfast:        Sourdough with jelly and sweetened condensed milk; matcha tea at home   AM Snack:       None  Lunch:             Barbacoa quesadilla and fruit; caldo de res; tinga with 1 corn tortilla; water  PM Snack:      Snack - snack size bag of  [employer] takis or individual size baked chips  Dinner:            Carne asada, jennifer, dirk (3), grilled chicken and rice  HS Snack:       Cottage cheese with small amount of jam  Beverages:      Water, matcha tea (homemade) 3-4 days per week, strawberry rhubarb soda from  [employer] (60 kcals and 13 g sugar; 1-2 times per week)          Dining Out  Ismael eating out less often. Goes out with family on weekends. Sometimes if they don't have food at home she would get Chick[employer]Chandrakant-A queen salad or [employer] (plate with chow mein, honey sesame chicken and teriyaki chicken with water). Sometimes she saves half for at home or she will eat the whole thing.     Physical Activity   Active with job. [employer] at home. Interested in trying barre, pilates or yoga at home. Bought a stair stepper.     Previous Goals & Progress  Try matcha vanilla tea latte at [place]/consider less often/smaller size - going well. Now doing matcha at home   Consider bringing a small treat in lunch bag rather than going out for a coffee/tea drink. - not getting coffee drinks as much  Try to find a way to have vegetables once per day as a start. - goal not addressed    Medications/Vitamins/Minerals    Current Outpatient Medications:     ferrous sulfate (FEROSUL) 325 (65 Fe) MG tablet, Take 1 tablet (325 mg) by mouth daily (with breakfast), Disp: 30 tablet, Rfl: 11    metFORMIN (GLUCOPHAGE XR) 500 MG 24 hr tablet, Take 1 tablet (500 mg) by mouth 2 times daily (with meals), Disp: 180 tablet, Rfl: 0    phentermine 30 MG capsule, Take 1 capsule (30 mg) by mouth every morning, Disp: 30 capsule, Rfl: 2    spironolactone  (ALDACTONE) 50 MG tablet, TAKE 2 TABLETS BY MOUTH DAILY, Disp: 180 tablet, Rfl: 1    tretinoin (RETIN-A) 0.05 % external cream, Apply small amount to acne prone areas on forehead at bedtime as directed, Disp: 45 g, Rfl: 1    triamcinolone (KENALOG) 0.1 % external ointment, Apply topically 2 times daily To rashes on elbows until resolved, Disp: 30 g, Rfl: 3    vitamin D3 (CHOLECALCIFEROL) 1.25 MG (94897 UT) capsule, Take 1 capsule (50,000 Units) by mouth every 7 days, Disp: 12 capsule, Rfl: 0    topiramate (TOPAMAX) 100 MG tablet, Take 1 tablet (100 mg) by mouth at bedtime for 90 days, Disp: 90 tablet, Rfl: 0    Nutrition Diagnosis  Obesity related to excessive energy intake as evidenced by BMI/age >95th %ile    Interventions & Education  Provided written and verbal education on the following:    Healthy meals/cooking  Healthy snacks  Healthy beverages  Increase fruit and vegetable intake  Physical activity  Med adherence    Monitoring/Evaluation  Will continue to monitor progress towards goals and provide education in Pediatric Weight Management.    Spent 30 minutes in consult with patient.

## 2024-04-23 NOTE — PATIENT INSTRUCTIONS
GOALS  Set alarm for morning metformin at 800 am.  Try the  Mitchell's Sparkling Auguste with no added sugar/sweetener.   In the morning, try to have some sort of protein: boiled eggs with fruit, Greek yogurt or cottage cheese with fruit, small handful of nuts with fruit, avocado toast with everything bagel seasoning +/- egg  Try to increase physical activity. Go for walks or use your stair stepper, biking etc. Start with 3 days per week.

## 2024-04-25 LAB
FACT VIII ACT/NOR PPP: 71 % (ref 55–200)
VWF AG ACT/NOR PPP IA: 61 % (ref 50–200)
VWF:AC ACT/NOR PPP IA: 51 % (ref 50–180)

## 2024-04-29 LAB — VWF MULTIMERS PPP IB: NORMAL

## 2024-05-01 LAB — VWF:RCO ACT/NOR PPP PL AGG: 39 %

## 2024-05-06 LAB
VON WILLEBRAND EVAL PPP-IMP: NORMAL
VWF MULTIMERS PPP QL: NORMAL

## 2024-05-16 ENCOUNTER — OFFICE VISIT (OUTPATIENT)
Dept: ENDOCRINOLOGY | Facility: CLINIC | Age: 19
End: 2024-05-16
Attending: PEDIATRICS
Payer: COMMERCIAL

## 2024-05-16 VITALS
HEIGHT: 66 IN | DIASTOLIC BLOOD PRESSURE: 80 MMHG | SYSTOLIC BLOOD PRESSURE: 117 MMHG | BODY MASS INDEX: 31.53 KG/M2 | WEIGHT: 196.21 LBS

## 2024-05-16 DIAGNOSIS — L64.9 ANDROGENIC ALOPECIA: ICD-10-CM

## 2024-05-16 DIAGNOSIS — R73.03 PREDIABETES: ICD-10-CM

## 2024-05-16 DIAGNOSIS — L83 ACANTHOSIS NIGRICANS: ICD-10-CM

## 2024-05-16 DIAGNOSIS — R06.83 SNORING: ICD-10-CM

## 2024-05-16 DIAGNOSIS — E55.9 VITAMIN D DEFICIENCY: Primary | ICD-10-CM

## 2024-05-16 LAB — HBA1C MFR BLD: 5.2 % (ref 4.3–?)

## 2024-05-16 PROCEDURE — 99215 OFFICE O/P EST HI 40 MIN: CPT | Performed by: PEDIATRICS

## 2024-05-16 PROCEDURE — 83036 HEMOGLOBIN GLYCOSYLATED A1C: CPT | Performed by: PEDIATRICS

## 2024-05-16 NOTE — PROGRESS NOTES
Pediatric Endocrinology Follow-up Consultation    Patient: Ismael Pepper MRN# [id number]   YOB: 2005 Age: 19 year old    Date of Visit: 05/16/2024     Dear Roverto Ramírez:    I had the pleasure of seeing your patient, Ismael Pepper in the Pediatric Endocrinology Clinic of the [facility] ([facility] [place]), on 05/16/2024 for a follow-up visit regarding  pre-diabetes and PCOS . History was obtained from the patient, and the medical record.     Clinical Summary:    Ismael is an 19 year old female with a past medical history significant for obesity, irregular menses, hirsutism, prediabetes and seasonal allergies, who was first seen in our pediatric endocrinology clinic on 6/2019 as part of her Weight Management evaluation.     Menarche occurred at 10 years of age. In the year prior to her initial visit, they occurred every other month, but disappeared for 3-4 months. In the months preceding to her initial visit, her periods reportedly had restarted monthly. She was also noted to have some hirsutism, but no acne. Periods reportedly lasted about 5 days and were not noted to be particularly heavy or painful.     PCP obtained labs that's showed a mildly elevated free testosterone from the labs previously obtained by her PCP. Prolactin was within normal limits.  She was started on Metformin in May of 2019 and was noted to be tolerating it without issues. She was noted to be sedentary; she was also noted to feel hungry all of the time and to have food sneaking behaviors. Amount of non academic screen time per day: ~ 7 hours. Labs obtained during this visit showed that her Hemoglobin A1c was in the pre diabetes range. Otherwise, the rest of her labs were within normal limits (TSH, 17-OHP, DHEA-S, Androstenedione). Her clinical picture was noted to support the diagnosis of PCOS.     Since then she has remained on Metformin since. Per Ismael and her mom,  "menstrual periods have been regular for the past 3 years, lasting 5 days on average and remain not heavy or painful. She denies any history of nipple discharge.    Ismael was also seen by dermatology (11/2021); she was diagnosed with androgenetic alopecia and acne vulgaris. She was started on spironolactone and is currently on 50 mg PO BID. She has been tolerating this without issues. She also has been on iron, vitamin D supplementation, as well as tretinoin cream for her acne.      Ismael reports using Rubio to her upper lip 1x per month. She also reports developing some facial acne during her periods. She reports that the hair on her back and abdomen has remained stable. She feels that the acanthosis nigricans over her neck \"waxes and weans\" and most recently it has been less evident.     Ismael continues to follow with the Weight Management team ( 10/3/2023). Currently she is on Metformin 500 mg 1 tablet PO 3x daily and has been tolerating it without issues. Ismael has been on Topamax and Phentermine to assist with her weight. At her 4/2023 there was a plan for Ismael to be started on Wegovy, but due to the shortage as well as her own concerns of possible side effects this was never started. Review of her weight shows that since 8/2023, Ismael has lost 11 pounds and 1.5 BMI units. Ismael states that while she is not actively exercising, she is trying to be more careful about her dietary choices and eating smaller portions. Also seeing Dad recently diagnosed with diabetes has made herself to work harder over her diet choices. Denies any recent concerns of polyuria, polydipsia.Ismael report symptoms of snoring without apnea. She has been waking up with good energy levels. No recent headaches or vision changes.     Interval History (May 16, 2024):    Since their last visit with pediatric endocrinology (11/27/2023), Ismael has been doing well overall and denied any significant or recurrent illness or " hospitalizations since.    Ismael continues to follow with our Weight Management clinic colleagues. She continues to improve her compliance with her Metformin, and is taking 1500 mg PO qday. She also reports that she was started on Zepbound on 2/2024, although she only used It for the first 4 injections and stopped it due to issues with bruising with the injection. She also acknowledged that  would get very nervous during the time of its administration. She also is taking the Phentermine and Topiramate (states that she still has plenty of each due to her non consistency with taking them in the past).    Review of her weight status shows that she has lost 6 pounds since her last visit with Weight Management (but gained 4 pounds since her last visit with endocrinology). She was very surprised of this as she feels like she has been eating more due to family events / birthday celebrations. No polyuria, polydipsia reported. She continues to snore without gaps in her breathing.     Ismael reports that she continues to have her menstrual periods on a consistent basis.     Ismael denies experiencing any symptoms of hyperthyroidism or hypothyroidism.  No concerns of headaches, vision changes.     Ismael continues to follow with dermatology. She remains on the spironolactone.     No new history of fractures.     Patient's previous growth chart, records and laboratory tests and imaging studies are reviewed. Patient's medications, allergies, past medical, surgical, social and family histories reviewed and updated as appropriate.    Past Medical History:   No past medical history on file.    Past Surgical History:   No past surgical history on file.    Social History:     Social History     Social History Narrative    Not on file      Ismael currently lives at home with her parents and sibling. Ismael works at a .     Family History:     Family History   Problem Relation Age of Onset    Menstrual problems Mother          "Hx of irregular periods    Hyperlipidemia Father     Diabetes Father         Type 2 diabetes, on oral meds    Hyperlipidemia Maternal Grandmother     Diabetes Maternal Grandmother         Type 2, on oral meds    Polycystic ovary syndrome No family hx of       Mother's height: 1.626 m (5' 4\"). Onset of menarche was at the age of 11 years.    Father's height: 1.778 m (5' 10\").    Midparental height: 1.638 m (5' 4.5\") (+/- 2 inches) 54 %ile (Z= 0.09) based on CDC (Girls, 2-20 Years) stature-for-age data calculated at age 19 using the patient's mid-parental height..    Hypertension: None  T2DM: Maternal grandmother  Gestational diabetes: Mother had it during pregnancy with Ismael  Premature cardiovascular disease: None  Obstructive sleep apnea: None  Excess Weight Issue: Mom was obese previously, (weighed > 200 Ib) changed her eating habits and lost weight  Weight Loss Surgery: None     History of:  Adrenal insufficiency: none  Autoimmune disease: none.  Calcium problems: none.  Delayed puberty: none.  Diabetes mellitus: none.  Early puberty: none.  Genetic disease: none.  Short stature: none  Tall stature: none.  Thyroid disease: none   Other: cancer: none.     Allergies:     Allergies   Allergen Reactions    Seasonal Allergies      Current Medications:     Current Outpatient Medications   Medication Sig Dispense Refill    metFORMIN (GLUCOPHAGE XR) 500 MG 24 hr tablet Take 1 tablet (500 mg) by mouth 3 times daily (with meals) for 60 days (Patient taking differently: Take 500 mg by mouth 3 times daily (with meals) 3 pills daily with meals) 180 tablet 0    phentermine 30 MG capsule Take 1 capsule (30 mg) by mouth every morning 30 capsule 2    spironolactone (ALDACTONE) 50 MG tablet TAKE 2 TABLETS BY MOUTH DAILY 180 tablet 1    tretinoin (RETIN-A) 0.05 % external cream Apply small amount to acne prone areas on forehead at bedtime as directed 45 g 1    vitamin D3 (CHOLECALCIFEROL) 1.25 MG (58672 UT) capsule Take 1 capsule " "(50,000 Units) by mouth every 7 days 12 capsule 0    ferrous sulfate (FEROSUL) 325 (65 Fe) MG tablet Take 1 tablet (325 mg) by mouth daily (with breakfast) (Patient not taking: Reported on 5/16/2024) 30 tablet 11    topiramate (TOPAMAX) 100 MG tablet Take 1 tablet (100 mg) by mouth at bedtime for 90 days 90 tablet 0    triamcinolone (KENALOG) 0.1 % external ointment Apply topically 2 times daily To rashes on elbows until resolved (Patient not taking: Reported on 5/16/2024) 30 g 3     Review of Systems:     Gen: Negative  Eye: Negative  ENT: Negative  Pulmonary:  Negative  Cardio: Negative  Gastrointestinal: Negative  Hematologic: Easy bruising  Genitourinary: Negative  Musculoskeletal: Negative  Psychiatric: Negative  Neurologic: Negative  Skin: history of acne and alopecia. On spironolactone (managed by derm). Also acanthosis nigricans ( 12/2023)  Endocrine: see HPI.       Physical Exam:   Blood pressure 117/80, height 1.68 m (5' 6.14\"), weight 89 kg (196 lb 3.4 oz), not currently breastfeeding.  Growth %ile SmartLinks can only be used for patients less than 20 years old.  Height: 168 cm  (0\") Facility age limit for growth %suzy is 20 years.  Weight: 89 kg (actual weight), Facility age limit for growth %suzy is 20 years.  BMI: Body mass index is 31.53 kg/m . Facility age limit for growth %suzy is 20 years.      Physical Exam  Vitals reviewed.   Constitutional:       General: She is not in acute distress.     Appearance: Normal appearance. She is obese.   HENT:      Head: Normocephalic.      Nose: Nose normal.      Mouth/Throat:      Mouth: Mucous membranes are moist.   Eyes:      Extraocular Movements: Extraocular movements intact.      Conjunctiva/sclera: Conjunctivae normal.   Cardiovascular:      Rate and Rhythm: Normal rate.      Pulses: Normal pulses.      Heart sounds: Normal heart sounds.   Pulmonary:      Effort: Pulmonary effort is normal.      Breath sounds: Normal breath sounds.   Abdominal:      " General: Abdomen is flat. Bowel sounds are normal.   Musculoskeletal:      Cervical back: Normal range of motion and neck supple.   Skin:     General: Skin is warm.      Comments: Acanthosis nigricans over neck, axilla, knuckles, some peeling over her forehead, minimal amount of acne. Some thin, lightly covered hair extending over her abdomen and back.    Neurological:      General: No focal deficit present.      Mental Status: She is alert and oriented to person, place, and time.   Psychiatric:         Mood and Affect: Mood normal.         Behavior: Behavior normal.        Assessment and Plan:     Ismael is a 19 year old female with a past medical history significant for besity, irregular menses, hirsutism, prediabetes and seasonal allergies who is seen today in our pediatric endocrinology clinic for a follow up evaluation of  prediabetes and PCOS . Ismael remains on Metformin (prescribed by Weight Management), and Spironolactone (prescribed by Dermatology). Ismael continues to have regular periods. She does not have any worsening symptoms of polyuria, polydipsia. Her Hemoglobin A1c level checked today in clinic was within normal limits. Her weight has remained stable compared to her last visit with endocrinology.    It appeared that Ismael had been started on a GLP-1 by our Weight Management colleagues, but Ismael only used it for a short period of time. It sounds like Ismael then restarted the Topamax and Phentermine and has been trying to be more consistent about taking them. I recommended for Ismael to let the Weight Management team know that she has been taking it so that they can provide refills. While they have reviewed the benefits from a weekly GLP-1 agonist like Zepbound, I suggested to discuss and reconsider the possibility for her to go back on it in the future.     Plan:    - Reviewed Ismael's growth charts  - Reviewed Ismael's previous lab results  - Reviewed notes from dermatology, endocrinology,  Weight Management clinic  - No labs or imaging ordered.   - Continue Metformin, Topamax and Phentermine (prescribed by Weight Management), Spironolactone (prescribed by dermatology).  - Follow up with endocrinology as needed.     No orders of the defined types were placed in this encounter.     Thank you for allowing me to participate in the care of Ismael.  Please do not hesitate to call with questions or concerns.    Sincerely,    Red Hall MD  Division of Pediatric Endocrinology  [facility]    A total of 42 minutes were spent on the date of the encounter doing chart review, history and exam, documentation and further activities per the note.

## 2024-05-16 NOTE — LETTER
5/16/2024      RE: Ismael Pepper  [address]  [facility] [address]     Dear Colleague,    Thank you for the opportunity to participate in the care of your patient, Ismael Pepper, at the [facility]. Please see a copy of my visit note below.    Pediatric Endocrinology Follow-up Consultation    Patient: Ismael Pepper MRN# [id number]   YOB: 2005 Age: 19 year old    Date of Visit: 05/16/2024     Dear Roverto Ramírez:    I had the pleasure of seeing your patient, Ismael Pepper in the Pediatric Endocrinology Clinic of the [facility] ([facility]), on 05/16/2024 for a follow-up visit regarding  pre-diabetes and PCOS . History was obtained from the patient, and the medical record.     Clinical Summary:    Ismael is an 19 year old female with a past medical history significant for obesity, irregular menses, hirsutism, prediabetes and seasonal allergies, who was first seen in our pediatric endocrinology clinic on 6/2019 as part of her Weight Management evaluation.     Menarche occurred at 10 years of age. In the year prior to her initial visit, they occurred every other month, but disappeared for 3-4 months. In the months preceding to her initial visit, her periods reportedly had restarted monthly. She was also noted to have some hirsutism, but no acne. Periods reportedly lasted about 5 days and were not noted to be particularly heavy or painful.     PCP obtained labs that's showed a mildly elevated free testosterone from the labs previously obtained by her PCP. Prolactin was within normal limits.  She was started on Metformin in May of 2019 and was noted to be tolerating it without issues. She was noted to be sedentary; she was also noted to feel hungry all of the time and to have food sneaking behaviors. Amount of non academic screen time  "per day: ~ 7 hours. Labs obtained during this visit showed that her Hemoglobin A1c was in the pre diabetes range. Otherwise, the rest of her labs were within normal limits (TSH, 17-OHP, DHEA-S, Androstenedione). Her clinical picture was noted to support the diagnosis of PCOS.     Since then she has remained on Metformin since. Per Ismael and her mom, menstrual periods have been regular for the past 3 years, lasting 5 days on average and remain not heavy or painful. She denies any history of nipple discharge.    Ismael was also seen by dermatology (11/2021); she was diagnosed with androgenetic alopecia and acne vulgaris. She was started on spironolactone and is currently on 50 mg PO BID. She has been tolerating this without issues. She also has been on iron, vitamin D supplementation, as well as tretinoin cream for her acne.      Ismael reports using Rubio to her upper lip 1x per month. She also reports developing some facial acne during her periods. She reports that the hair on her back and abdomen has remained stable. She feels that the acanthosis nigricans over her neck \"waxes and weans\" and most recently it has been less evident.     Ismael continues to follow with the Weight Management team ([facility] 10/3/2023). Currently she is on Metformin 500 mg 1 tablet PO 3x daily and has been tolerating it without issues. Ismael has been on Topamax and Phentermine to assist with her weight. At her 4/2023 there was a plan for Ismael to be started on Wegovy, but due to the shortage as well as her own concerns of possible side effects this was never started. Review of her weight shows that since 8/2023, Ismael has lost 11 pounds and 1.5 BMI units. Ismael states that while she is not actively exercising, she is trying to be more careful about her dietary choices and eating smaller portions. Also seeing Dad recently diagnosed with diabetes has made herself to work harder over her diet choices. Denies any recent concerns of polyuria, " polydipsia.Ismael report symptoms of snoring without apnea. She has been waking up with good energy levels. No recent headaches or vision changes.     Interval History (May 16, 2024):    Since their last visit with pediatric endocrinology (11/27/2023), Ismael has been doing well overall and denied any significant or recurrent illness or hospitalizations since.    Ismael continues to follow with our Weight Management clinic colleagues. She continues to improve her compliance with her Metformin, and is taking 1500 mg PO qday. She also reports that she was started on Zepbound on 2/2024, although she only used It for the first 4 injections and stopped it due to issues with bruising with the injection. She also acknowledged that  would get very nervous during the time of its administration. She also is taking the Phentermine and Topiramate (states that she still has plenty of each due to her non consistency with taking them in the past).    Review of her weight status shows that she has lost 6 pounds since her last visit with Weight Management (but gained 4 pounds since her last visit with endocrinology). She was very surprised of this as she feels like she has been eating more due to family events / birthday celebrations. No polyuria, polydipsia reported. She continues to snore without gaps in her breathing.     Ismael reports that she continues to have her menstrual periods on a consistent basis.     Ismael denies experiencing any symptoms of hyperthyroidism or hypothyroidism.  No concerns of headaches, vision changes.     Ismael continues to follow with dermatology. She remains on the spironolactone.     No new history of fractures.     Patient's previous growth chart, records and laboratory tests and imaging studies are reviewed. Patient's medications, allergies, past medical, surgical, social and family histories reviewed and updated as appropriate.    Past Medical History:   No past medical history on file.    Past  "Surgical History:   No past surgical history on file.    Social History:     Social History     Social History Narrative     Not on file      Ismael currently lives at home with her parents and sibling. Ismael works at a .     Family History:     Family History   Problem Relation Age of Onset     Menstrual problems Mother         Hx of irregular periods     Hyperlipidemia Father      Diabetes Father         Type 2 diabetes, on oral meds     Hyperlipidemia Maternal Grandmother      Diabetes Maternal Grandmother         Type 2, on oral meds     Polycystic ovary syndrome No family hx of       Mother's height: 1.626 m (5' 4\"). Onset of menarche was at the age of 11 years.    Father's height: 1.778 m (5' 10\").    Midparental height: 1.638 m (5' 4.5\") (+/- 2 inches) 54 %ile (Z= 0.09) based on [facility] (Girls, 2-20 Years) stature-for-age data calculated at age 18 using the patient's mid-parental height..    Hypertension: None  T2DM: Maternal grandmother  Gestational diabetes: Mother had it during pregnancy with Ismael  Premature cardiovascular disease: None  Obstructive sleep apnea: None  Excess Weight Issue: Mom was obese previously, (weighed > 200 Ib) changed her eating habits and lost weight  Weight Loss Surgery: None     History of:  Adrenal insufficiency: none  Autoimmune disease: none.  Calcium problems: none.  Delayed puberty: none.  Diabetes mellitus: none.  Early puberty: none.  Genetic disease: none.  Short stature: none  Tall stature: none.  Thyroid disease: none   Other: cancer: none.     Allergies:     Allergies   Allergen Reactions     Seasonal Allergies      Current Medications:     Current Outpatient Medications   Medication Sig Dispense Refill     metFORMIN (GLUCOPHAGE XR) 500 MG 24 hr tablet Take 1 tablet (500 mg) by mouth 3 times daily (with meals) for 60 days (Patient taking differently: Take 500 mg by mouth 3 times daily (with meals) 3 pills daily with meals) 180 tablet 0     phentermine 30 MG " "capsule Take 1 capsule (30 mg) by mouth every morning 30 capsule 2     spironolactone (ALDACTONE) 50 MG tablet TAKE 2 TABLETS BY MOUTH DAILY 180 tablet 1     tretinoin (RETIN-A) 0.05 % external cream Apply small amount to acne prone areas on forehead at bedtime as directed 45 g 1     vitamin D3 (CHOLECALCIFEROL) 1.25 MG (59363 UT) capsule Take 1 capsule (50,000 Units) by mouth every 7 days 12 capsule 0     ferrous sulfate (FEROSUL) 325 (65 Fe) MG tablet Take 1 tablet (325 mg) by mouth daily (with breakfast) (Patient not taking: Reported on 5/16/2024) 30 tablet 11     topiramate (TOPAMAX) 100 MG tablet Take 1 tablet (100 mg) by mouth at bedtime for 90 days 90 tablet 0     triamcinolone (KENALOG) 0.1 % external ointment Apply topically 2 times daily To rashes on elbows until resolved (Patient not taking: Reported on 5/16/2024) 30 g 3     Review of Systems:     Gen: Negative  Eye: Negative  ENT: Negative  Pulmonary:  Negative  Cardio: Negative  Gastrointestinal: Negative  Hematologic: Easy bruising  Genitourinary: Negative  Musculoskeletal: Negative  Psychiatric: Negative  Neurologic: Negative  Skin: history of acne and alopecia. On spironolactone (managed by derm). Also acanthosis nigricans ( 12/2023)  Endocrine: see HPI.       Physical Exam:   Blood pressure 117/80, height 1.68 m (5' 6.14\"), weight 89 kg (196 lb 3.4 oz), not currently breastfeeding.  Growth %ile SmartLinks can only be used for patients less than 20 years old.  Height: 168 cm  (0\") Facility age limit for growth %suzy is 20 years.  Weight: 89 kg (actual weight), Facility age limit for growth %suzy is 20 years.  BMI: Body mass index is 31.53 kg/m . Facility age limit for growth %suzy is 20 years.      Physical Exam  Vitals reviewed.   Constitutional:       General: She is not in acute distress.     Appearance: Normal appearance. She is obese.   HENT:      Head: Normocephalic.      Nose: Nose normal.      Mouth/Throat:      Mouth: Mucous membranes are " moist.   Eyes:      Extraocular Movements: Extraocular movements intact.      Conjunctiva/sclera: Conjunctivae normal.   Cardiovascular:      Rate and Rhythm: Normal rate.      Pulses: Normal pulses.      Heart sounds: Normal heart sounds.   Pulmonary:      Effort: Pulmonary effort is normal.      Breath sounds: Normal breath sounds.   Abdominal:      General: Abdomen is flat. Bowel sounds are normal.   Musculoskeletal:      Cervical back: Normal range of motion and neck supple.   Skin:     General: Skin is warm.      Comments: Acanthosis nigricans over neck, axilla, knuckles, some peeling over her forehead, minimal amount of acne. Some thin, lightly covered hair extending over her abdomen and back.    Neurological:      General: No focal deficit present.      Mental Status: She is alert and oriented to person, place, and time.   Psychiatric:         Mood and Affect: Mood normal.         Behavior: Behavior normal.        Assessment and Plan:     Ismael is a 19 year old female with a past medical history significant for besity, irregular menses, hirsutism, prediabetes and seasonal allergies who is seen today in our pediatric endocrinology clinic for a follow up evaluation of  prediabetes and PCOS . Ismael remains on Metformin (prescribed by Weight Management), and Spironolactone (prescribed by Dermatology). Ismael continues to have regular periods. She does not have any worsening symptoms of polyuria, polydipsia. Her Hemoglobin A1c level checked today in clinic was within normal limits. Her weight has remained stable compared to her last visit with endocrinology.    It appeared that Ismael had been started on a GLP-1 by our Weight Management colleagues, but Ismael only used it for a short period of time. It sounds like Ismael then restarted the Topamax and Phentermine and has been trying to be more consistent about taking them. I recommended for Ismael to let the Weight Management team know that she has been taking  it so that they can provide refills. While they have reviewed the benefits from a weekly GLP-1 agonist like Zepbound, I suggested to discuss and reconsider the possibility for her to go back on it in the future.     Plan:    - Reviewed Ismael's growth charts  - Reviewed Ismael's previous lab results  - Reviewed notes from dermatology, endocrinology, Weight Management clinic  - No labs or imaging ordered.   - Continue Metformin, Topamax and Phentermine (prescribed by Weight Management), Spironolactone (prescribed by dermatology).  - Follow up with endocrinology as needed.     No orders of the defined types were placed in this encounter.     Thank you for allowing me to participate in the care of Ismael.  Please do not hesitate to call with questions or concerns.    Sincerely,    Red Hall MD  Division of Pediatric Endocrinology  [facility]    A total of 42 minutes were spent on the date of the encounter doing chart review, history and exam, documentation and further activities per the note.

## 2024-05-16 NOTE — NURSING NOTE
"[facility] [[id number]]  Chief Complaint   Patient presents with    RECHECK     Prediabete     Initial /80 (BP Location: Right arm, Patient Position: Sitting, Cuff Size: Adult Small)   Ht 1.68 m (5' 6.14\")   Wt 89 kg (196 lb 3.4 oz)   BMI 31.53 kg/m   Estimated body mass index is 31.53 kg/m  as calculated from the following:    Height as of this encounter: 1.68 m (5' 6.14\").    Weight as of this encounter: 89 kg (196 lb 3.4 oz).  Medication Reconciliation: complete    Does the patient need any medication refills today? No    Does the patient/parent need MyChart or Proxy acces today? No            "

## 2024-05-16 NOTE — PATIENT INSTRUCTIONS
[facility]   Pediatric Specialty Clinic [place]      Pediatric Call Center Scheduling and Nurse Questions:  [phone]    After hours urgent matters that cannot wait until the next business day:  [phone].  Ask for the on-call pediatric doctor for the specialty you are calling for be paged.      Prescription Renewals:  Please call your pharmacy first.  Your pharmacy must fax requests to [phone].  Please allow 2-3 days for prescriptions to be authorized.    If your physician has ordered a CT or MRI, you may schedule this test by calling [facility] Radiology in [place] at [phone].        **If your child is having a sedated procedure, they will need a history and physical done at their Primary Care Provider within 30 days of the procedure.  If your child was seen by the ordering provider in our office within 30 days of the procedure, their visit summary will work for the H&P unless they inform you otherwise.  If you have any questions, please call the RN Care Coordinator.**

## 2024-05-29 ENCOUNTER — MYC MEDICAL ADVICE (OUTPATIENT)
Dept: NURSING | Facility: CLINIC | Age: 19
End: 2024-05-29
Payer: COMMERCIAL

## 2024-05-29 DIAGNOSIS — R79.1 LOW RISTOCETIN COFACTOR ACTIVITY: Primary | ICD-10-CM

## 2024-06-11 ENCOUNTER — TELEPHONE (OUTPATIENT)
Dept: PEDIATRIC HEMATOLOGY/ONCOLOGY | Facility: CLINIC | Age: 19
End: 2024-06-11
Payer: COMMERCIAL

## 2024-06-11 NOTE — TELEPHONE ENCOUNTER
Reached out to Tequila Nunez by [employer] message to let her know that after review of Ismael's Hem/Onc referral, Dr Powers recommended an adult referral to [facility].    I did let her know we'd be canceling this referral from the pediatric work queue and that she would need to re-enter it correctly so it falls into the adult scheduling work queue.

## 2024-06-12 NOTE — TELEPHONE ENCOUNTER
Goldie Banuelos    6/11/24  3:33 PM  Note  Reached out to Tequila Nunez by [employer] message to let her know that after review of Ismael's Hem/Onc referral, Dr Powers recommended an adult referral to [facility].    I did let her know we'd be canceling this referral from the pediatric work queue and that she would need to re-enter it correctly so it falls into the adult scheduling work queue.

## 2024-06-17 DIAGNOSIS — N92.6 IRREGULAR MENSES: ICD-10-CM

## 2024-06-17 DIAGNOSIS — R73.03 PREDIABETES: ICD-10-CM

## 2024-06-17 DIAGNOSIS — L64.9 ANDROGENIC ALOPECIA: ICD-10-CM

## 2024-06-17 RX ORDER — SPIRONOLACTONE 50 MG/1
TABLET, FILM COATED ORAL
Qty: 180 TABLET | Refills: 1 | Status: SHIPPED | OUTPATIENT
Start: 2024-06-17 | End: 2024-07-16

## 2024-06-17 NOTE — TELEPHONE ENCOUNTER
Patient last saw Tequila Nunez on 4/23/24, and has an upcoming appt scheduled for 7/16/24.      This is a faxed refill request for Metformin 500 mg Tab from [facility] @ [address], [place], [place].      Last fill was 4/23/24

## 2024-06-17 NOTE — TELEPHONE ENCOUNTER
Refill requested for spironolactone (ALDACTONE) 50 MG tablets. Pt last seen by Dr. Wu 12/7/2024 and currently does not have follow up scheduled (at least in [facility] but pt was also recommend to an outside facility). Routed to Dr. Wu

## 2024-06-18 RX ORDER — METFORMIN HCL 500 MG
500 TABLET, EXTENDED RELEASE 24 HR ORAL
Qty: 270 TABLET | Refills: 0 | Status: SHIPPED | OUTPATIENT
Start: 2024-06-18

## 2024-07-16 ENCOUNTER — OFFICE VISIT (OUTPATIENT)
Dept: PEDIATRICS | Facility: CLINIC | Age: 19
End: 2024-07-16
Payer: COMMERCIAL

## 2024-07-16 ENCOUNTER — OFFICE VISIT (OUTPATIENT)
Dept: DERMATOLOGY | Facility: CLINIC | Age: 19
End: 2024-07-16
Payer: COMMERCIAL

## 2024-07-16 VITALS
WEIGHT: 195.11 LBS | DIASTOLIC BLOOD PRESSURE: 74 MMHG | SYSTOLIC BLOOD PRESSURE: 119 MMHG | BODY MASS INDEX: 31.36 KG/M2 | HEIGHT: 66 IN | HEART RATE: 82 BPM

## 2024-07-16 VITALS — BODY MASS INDEX: 31.36 KG/M2 | WEIGHT: 195.11 LBS | HEIGHT: 66 IN

## 2024-07-16 DIAGNOSIS — R45.4 ANGER REACTION: ICD-10-CM

## 2024-07-16 DIAGNOSIS — L83 ACANTHOSIS NIGRICANS: ICD-10-CM

## 2024-07-16 DIAGNOSIS — N92.6 IRREGULAR MENSES: ICD-10-CM

## 2024-07-16 DIAGNOSIS — Z60.4 SOCIAL ISOLATION: ICD-10-CM

## 2024-07-16 DIAGNOSIS — R06.83 SNORING: Primary | ICD-10-CM

## 2024-07-16 DIAGNOSIS — L65.9 ALOPECIA: ICD-10-CM

## 2024-07-16 DIAGNOSIS — L70.0 ACNE VULGARIS: ICD-10-CM

## 2024-07-16 DIAGNOSIS — R73.03 PREDIABETES: ICD-10-CM

## 2024-07-16 DIAGNOSIS — E55.9 VITAMIN D DEFICIENCY: ICD-10-CM

## 2024-07-16 DIAGNOSIS — L64.9 ANDROGENIC ALOPECIA: ICD-10-CM

## 2024-07-16 DIAGNOSIS — L68.0 HIRSUTISM: ICD-10-CM

## 2024-07-16 DIAGNOSIS — Z01.01 FAILED VISION SCREEN: ICD-10-CM

## 2024-07-16 PROCEDURE — 99213 OFFICE O/P EST LOW 20 MIN: CPT | Performed by: PHYSICIAN ASSISTANT

## 2024-07-16 PROCEDURE — 99214 OFFICE O/P EST MOD 30 MIN: CPT | Performed by: NURSE PRACTITIONER

## 2024-07-16 PROCEDURE — 97803 MED NUTRITION INDIV SUBSEQ: CPT

## 2024-07-16 RX ORDER — SPIRONOLACTONE 50 MG/1
TABLET, FILM COATED ORAL
Qty: 180 TABLET | Refills: 3 | Status: SHIPPED | OUTPATIENT
Start: 2024-07-16

## 2024-07-16 RX ORDER — TRETINOIN 0.5 MG/G
CREAM TOPICAL
Qty: 45 G | Refills: 11 | Status: SHIPPED | OUTPATIENT
Start: 2024-07-16

## 2024-07-16 SDOH — SOCIAL STABILITY - SOCIAL INSECURITY: SOCIAL EXCLUSION AND REJECTION: Z60.4

## 2024-07-16 ASSESSMENT — PAIN SCALES - GENERAL
PAINLEVEL: NO PAIN (0)

## 2024-07-16 NOTE — NURSING NOTE
"[facility] [[id number]]  Chief Complaint   Patient presents with    Follow Up     Initial /74   Pulse 82   Ht 1.68 m (5' 6.14\")   Wt 88.5 kg (195 lb 1.7 oz)   BMI 31.36 kg/m   Estimated body mass index is 31.36 kg/m  as calculated from the following:    Height as of this encounter: 1.68 m (5' 6.14\").    Weight as of this encounter: 88.5 kg (195 lb 1.7 oz).  Medication Reconciliation: complete    Does the patient/parent need MyChart or Proxy acces today? No            "

## 2024-07-16 NOTE — PROGRESS NOTES
Date: 2024    PATIENT:  Ismael Pepper  :          2005  CELESTINA:          2024    Dear Roverto Ramírez:    I had the pleasure of seeing your patient, Ismael Pepper, for a follow-up visit in the Pediatric Weight Management Clinic on 2024 at the [facility].  Ismael was last seen in this clinic 2024.  Please see below for my assessment and plan of care.    Intercurrent History:    Ismael was accompanied to this appointment by her mom.  As you may recall, Ismael is a 19 year old girl with history of elevated BMI   Since Ismael's last visit, Ismael has lost 7 pounds. She has been on a good schedule with taking her medication for appetite suppression.    Ismael is walking with her mom. She is also doing home work outs. Ismael is making dietary changes. She is eating smaller portions and less frequently.    Ismael is working full-time. 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     Current Medications:    Current Outpatient Rx   Medication Sig Dispense Refill    ferrous sulfate (FEROSUL) 325 (65 Fe) MG tablet Take 1 tablet (325 mg) by mouth daily (with breakfast) 30 tablet 11    metFORMIN (GLUCOPHAGE XR) 500 MG 24 hr tablet Take 1 tablet (500 mg) by mouth 3 times daily (with meals) 270 tablet 0    phentermine 30 MG capsule Take 1 capsule (30 mg) by mouth every morning 30 capsule 2    spironolactone (ALDACTONE) 50 MG tablet TAKE 2 TABLETS BY MOUTH DAILY 180 tablet 1    tretinoin (RETIN-A) 0.05 % external cream Apply small amount to acne prone areas on forehead at bedtime as directed 45 g 1    triamcinolone (KENALOG) 0.1 % external ointment Apply topically 2 times daily To rashes on elbows until resolved 30 g 3    vitamin D3 (CHOLECALCIFEROL) 1.25 MG (47747 UT) capsule Take 1 capsule (50,000 Units) by mouth every 7 days 12 capsule 0    topiramate (TOPAMAX) 100 MG tablet Take 1 tablet (100  "mg) by mouth at bedtime for 90 days 90 tablet 0       Physical Exam:    Vitals:  B/P: 119/74, P: 82, R: Data Unavailable   BP:  Growth %ile SmartLinks can only be used for patients less than 20 years old.    Measured Weights:  Wt Readings from Last 4 Encounters:   07/16/24 88.5 kg (195 lb 1.7 oz)   07/16/24 88.5 kg (195 lb 1.7 oz)   05/16/24 89 kg (196 lb 3.4 oz)   04/23/24 91.8 kg (202 lb 6.1 oz) (98%, Z= 1.98)*     * Growth percentiles are based on CDC (Girls, 2-20 Years) data.       Height:    Ht Readings from Last 4 Encounters:   07/16/24 1.68 m (5' 6.14\")   07/16/24 1.68 m (5' 6.14\")   05/16/24 1.68 m (5' 6.14\")   04/23/24 1.692 m (5' 6.61\") (82%, Z= 0.91)*     * Growth percentiles are based on CDC (Girls, 2-20 Years) data.       Body Mass Index:  Body mass index is 31.36 kg/m .  Body Mass Index Percentile:  Facility age limit for growth %suzy is 20 years.       Labs:  None today.    Assessment:      Ismael is a 19 year old female with a BMI in the obese category and at risk for weight related co-morbid illness. Today, we discussed continuing current treatment plan and physical activity. Kathryn is really excited about 7 pound weight loss and I encouraged her to continue toward her health goals       I spent a total of 30 minutes on date of encounter face to face with Ismael and family, more than 50% of which was spent in counseling and coordination of care so as to minimize the development and/or progression of obesity related co-morbid conditions.     Ismael s current problem list reviewed today includes:    Encounter Diagnoses   Name Primary?    Snoring Yes    BMI (body mass index), pediatric, greater than 99% for age     Vitamin D deficiency     Prediabetes     Hirsutism     Androgenic alopecia     Acanthosis nigricans     Irregular menses     Social isolation     Anger reaction     Failed vision screen         Care Plan:    Using motivational interviewing, Ismael made the following goals:  Continue " current treatment plan with medications.  Follow recommendations of dietitian.  Great job with physical activity.      I am looking forward to seeing Ismael for a follow-up visit in 8-10 weeks.    Thank you for including me in the care of your patient.  Please do not hesitate to call with questions or concerns.    Sincerely,    Tequila Nunez RN, CPNP  Department of Pediatrics  Pediatric Obesity and Weight Management Clinic  [facility] [phone]  [facility] [place] [phone]      CC  Copy to patient  Kassi Bourne,Manual  [address]  [facility] [address]

## 2024-07-16 NOTE — PATIENT INSTRUCTIONS
[facility]   Pediatric Specialty Clinic [place]      Pediatric Call Center Scheduling and Nurse Questions:  [phone]    After hours urgent matters that cannot wait until the next business day:  [phone].  Ask for the on-call pediatric doctor for the specialty you are calling for be paged.      Prescription Renewals:  Please call your pharmacy first.  Your pharmacy must fax requests to [phone].  Please allow 2-3 days for prescriptions to be authorized.    If your physician has ordered a CT or MRI, you may schedule this test by calling [facility] Radiology in [place] at [phone].        **If your child is having a sedated procedure, they will need a history and physical done at their Primary Care Provider within 30 days of the procedure.  If your child was seen by the ordering provider in our office within 30 days of the procedure, their visit summary will work for the H&P unless they inform you otherwise.  If you have any questions, please call the RN Care Coordinator.**     GOALS  Try increasing protein intake in the morning try a grab fast protein source  Premier Protein or Fairlife protein shake  Greek yogurt (Chobani or Oikos for example)  [place] pancake mix or frozen pancakes  Try to have something in the morning before work even when you don't feel hungry

## 2024-07-16 NOTE — NURSING NOTE
"[facility] [[id number]]  Chief Complaint   Patient presents with    Nutrition Counseling     Initial Ht 1.68 m (5' 6.14\")   Wt 88.5 kg (195 lb 1.7 oz)   BMI 31.36 kg/m   Estimated body mass index is 31.36 kg/m  as calculated from the following:    Height as of this encounter: 1.68 m (5' 6.14\").    Weight as of this encounter: 88.5 kg (195 lb 1.7 oz).  Medication Reconciliation: complete      Does the patient/parent need MyChart or Proxy acces today? No            "

## 2024-07-16 NOTE — LETTER
2024      RE: Ismael Pepper  [address]  [facility] [address]     Dear Colleague,    Thank you for referring your patient, Ismael Pepper, to the [facility] [place]. Please see a copy of my visit note below.    Date: 2024    PATIENT:  Ismael Pepper  :          2005  CELESTINA:          2024    Dear Roverto Ramírez:    I had the pleasure of seeing your patient, Ismael Pepper, for a follow-up visit in the Pediatric Weight Management Clinic on 2024 at the [facility].  Ismael was last seen in this clinic 2024.  Please see below for my assessment and plan of care.    Intercurrent History:    Ismael was accompanied to this appointment by her mom.  As you may recall, Ismael is a 19 year old girl with history of elevated BMI   Since Ismael's last visit, Ismael has lost 7 pounds. She has been on a good schedule with taking her medication for appetite suppression.    Ismael is walking with her mom. She is also doing home work outs. Ismael is making dietary changes. She is eating smaller portions and less frequently.    Ismael is working full-time. 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     Current Medications:    Current Outpatient Rx   Medication Sig Dispense Refill     ferrous sulfate (FEROSUL) 325 (65 Fe) MG tablet Take 1 tablet (325 mg) by mouth daily (with breakfast) 30 tablet 11     metFORMIN (GLUCOPHAGE XR) 500 MG 24 hr tablet Take 1 tablet (500 mg) by mouth 3 times daily (with meals) 270 tablet 0     phentermine 30 MG capsule Take 1 capsule (30 mg) by mouth every morning 30 capsule 2     spironolactone (ALDACTONE) 50 MG tablet TAKE 2 TABLETS BY MOUTH DAILY 180 tablet 1     tretinoin (RETIN-A) 0.05 % external cream Apply small amount to acne prone areas on forehead at bedtime as directed 45 g 1     triamcinolone (KENALOG) 0.1 %  "external ointment Apply topically 2 times daily To rashes on elbows until resolved 30 g 3     vitamin D3 (CHOLECALCIFEROL) 1.25 MG (99595 UT) capsule Take 1 capsule (50,000 Units) by mouth every 7 days 12 capsule 0     topiramate (TOPAMAX) 100 MG tablet Take 1 tablet (100 mg) by mouth at bedtime for 90 days 90 tablet 0       Physical Exam:    Vitals:  B/P: 119/74, P: 82, R: Data Unavailable   BP:  Growth %ile SmartLinks can only be used for patients less than 20 years old.    Measured Weights:  Wt Readings from Last 4 Encounters:   07/16/24 88.5 kg (195 lb 1.7 oz)   07/16/24 88.5 kg (195 lb 1.7 oz)   05/16/24 89 kg (196 lb 3.4 oz)   04/23/24 91.8 kg (202 lb 6.1 oz) (98%, Z= 1.98)*     * Growth percentiles are based on CDC (Girls, 2-20 Years) data.       Height:    Ht Readings from Last 4 Encounters:   07/16/24 1.68 m (5' 6.14\")   07/16/24 1.68 m (5' 6.14\")   05/16/24 1.68 m (5' 6.14\")   04/23/24 1.692 m (5' 6.61\") (82%, Z= 0.91)*     * Growth percentiles are based on CDC (Girls, 2-20 Years) data.       Body Mass Index:  Body mass index is 31.36 kg/m .  Body Mass Index Percentile:  Facility age limit for growth %suzy is 20 years.       Labs:  None today.    Assessment:      Isamel is a 19 year old female with a BMI in the obese category and at risk for weight related co-morbid illness. Today, we discussed continuing current treatment plan and physical activity. Kathryn is really excited about 7 pound weight loss and I encouraged her to continue toward her health goals       I spent a total of 30 minutes on date of encounter face to face with Ismael and family, more than 50% of which was spent in counseling and coordination of care so as to minimize the development and/or progression of obesity related co-morbid conditions.     Ismael s current problem list reviewed today includes:    Encounter Diagnoses   Name Primary?     Snoring Yes     BMI (body mass index), pediatric, greater than 99% for age      Vitamin D " deficiency      Prediabetes      Hirsutism      Androgenic alopecia      Acanthosis nigricans      Irregular menses      Social isolation      Anger reaction      Failed vision screen         Care Plan:    Using motivational interviewing, Isamel made the following goals:  Continue current treatment plan with medications.  Follow recommendations of dietitian.  Great job with physical activity.      I am looking forward to seeing Ismael for a follow-up visit in 8-10 weeks.    Thank you for including me in the care of your patient.  Please do not hesitate to call with questions or concerns.    Sincerely,    Tequila Nunez RN, CPNP  Department of Pediatrics  Pediatric Obesity and Weight Management Clinic  [facility] [phone]  [facility] [place] [phone]      CC  Copy to patient  BourneNallely lockettjustin Pepper,Manual  [address]  [facility] [address]      Again, thank you for allowing me to participate in the care of your patient.      Sincerely,    ANNE Hart CNP

## 2024-07-16 NOTE — PROGRESS NOTES
"PATIENT:  Ismael Pepper  :  2005  CELESTINA:  2024  Medical Nutrition Therapy    GOALS  Try increasing protein intake in the morning try a grab fast protein source  Premier Protein or Fairlife protein shake  Greek yogurt (Chobani or Oikos for example)  [place]cha pancake mix or frozen pancakes  Try to have something in the morning before work even when you don't feel hungry         Nutrition Reassessment  Ismael is a 19 year old year old female who presents to Pediatric Weight Management Clinic with class 1 obesity and prediabetes. Ismael was referred by ANNE Gutierrez, CNP for nutrition education and counseling, accompanied by mother.    Anthropometrics  Wt Readings from Last 4 Encounters:   24 88.5 kg (195 lb 1.7 oz)   24 89 kg (196 lb 3.4 oz)   24 91.8 kg (202 lb 6.1 oz) (98%, Z= 1.98)*   24 91.8 kg (202 lb 6.1 oz) (98%, Z= 1.98)*     * Growth percentiles are based on CDC (Girls, 2-20 Years) data.     Ht Readings from Last 2 Encounters:   24 1.68 m (5' 6.14\")   24 1.68 m (5' 6.14\")     Estimated body mass index is 31.36 kg/m  as calculated from the following:    Height as of this encounter: 1.68 m (5' 6.14\").    Weight as of this encounter: 88.5 kg (195 lb 1.7 oz).    Nutrition History  Ismael stopped taking the zepbound in March/April, did not like the side effects while she was taking the medication.  Has been taking her Metformin in the morning with an alarm and pill organzer.    Tring a bunch of sparkling frank - likes tj chico and choosing over sprite    Workin at a  M-F (8am-5pm)    Mom thinks she's is smaller portions and not eating as much as she used to eat     Has been waking up without an appetite and could go into the afternoon. - will make a green tea if she doesn't feel hungry      Nutritional Intakes  Breakfast: 2-3 pancakes, slice of white bread with jelly (strawberry), on the weekends will go out to eat with Dad and Sister  Lunch: eggs, " ham, chobani yogurt, water, grilled chicken w/ cauliflower rice, sometimes a fruit (grapes, melons)  Dinner: w/ mom steak, fries, chicken wraps, hamburger, tacos (3) corn tortilla, chicken with salad  HS Snack: sometimes  (3 days/week),  agustina's chili tortilla roll up chips, chocolate covered bananas  Beverages: water, sparkling water, green tea    Dining Out  Frequency: 1-2 times per week. Choices include:  Yum:  Chicken sandwich  Crunchy [place] toast    Activity  [place] slowly building up to running - 4/5 days/week  Waiting for a jump rope to come in the mail   Just got a hoCyzone hoop and has been trying to get into that    Previous Goals & Progress  Set alarm for morning metformin at 800 am.- met  Try the  Osorio Sparkling Auguste with no added sugar/sweetener. - met  In the morning, try to have some sort of protein: boiled eggs with fruit, Greek yogurt or cottage cheese with fruit, small handful of nuts with fruit, avocado toast with everything bagel seasoning +/- egg - not met, working on adding more protein with meals  Try to increase physical activity. Go for walks or use your stair stepper, biking etc. Start with 3 days per week. - ongoing goal      Medications/Vitamins/Minerals    Current Outpatient Medications:     ferrous sulfate (FEROSUL) 325 (65 Fe) MG tablet, Take 1 tablet (325 mg) by mouth daily (with breakfast), Disp: 30 tablet, Rfl: 11    metFORMIN (GLUCOPHAGE XR) 500 MG 24 hr tablet, Take 1 tablet (500 mg) by mouth 3 times daily (with meals), Disp: 270 tablet, Rfl: 0    phentermine 30 MG capsule, Take 1 capsule (30 mg) by mouth every morning, Disp: 30 capsule, Rfl: 2    spironolactone (ALDACTONE) 50 MG tablet, TAKE 2 TABLETS BY MOUTH DAILY, Disp: 180 tablet, Rfl: 1    topiramate (TOPAMAX) 100 MG tablet, Take 1 tablet (100 mg) by mouth at bedtime for 90 days, Disp: 90 tablet, Rfl: 0    tretinoin (RETIN-A) 0.05 % external cream, Apply small amount to acne prone  areas on forehead at bedtime as directed, Disp: 45 g, Rfl: 1    triamcinolone (KENALOG) 0.1 % external ointment, Apply topically 2 times daily To rashes on elbows until resolved, Disp: 30 g, Rfl: 3    vitamin D3 (CHOLECALCIFEROL) 1.25 MG (89040 UT) capsule, Take 1 capsule (50,000 Units) by mouth every 7 days, Disp: 12 capsule, Rfl: 0    Nutrition-Related Labs  5/16/24  Hmg A1c 5.2 WNL (down from 6.2 on 8/1/23)    Nutrition Diagnosis  Obesity related to excessive energy intake as evidenced by BMI/age >95th %ile    Interventions & Education  Provided written and verbal education on the following:    Healthy lunchs  Healthy snacks  Increasing protein    Monitoring/Evaluation  Will continue to monitor progress towards goals and provide education in Pediatric Weight Management.    Spent 15 minutes in consult with patient & mother.       Shannon Kyle, MS, RD, LD  Pediatric Clinical Dietitian  Phone: [phone]  Fax: [phone]

## 2024-07-16 NOTE — LETTER
7/16/2024      [facility] Pepper  [address] [facility] [address]      Dear Colleague,    Thank you for referring your patient, Ismael Pepper, to the [facility]. Please see a copy of my visit note below.    HPI:   Chief complaints: Ismael Pepper is a pleasant 19 year old female who presents for evaluation hair loss. She has noticed thinning and increased shedding over the past few years. She has been taking spironolactone 100 mg with improvement. She feels like she is still shedding, but it has been consistent over the past 3 years. She does feel her hair is the same density. No pain or itching on the scalpIn the past she did have iron and vitamin D deficiencies and she has been consistently supplementing for this. No issues on lucila. She also uses tretinoin for acne which has been helpful. Lastly she does have a history of keratosis pilaris and would like recommendations on treatments for this.       PHYSICAL EXAM:    Breastfeeding No   Skin exam performed as follows: Type 3 skin. Mood appropriate  Alert and Oriented X 3. Well developed, well nourished in no distress.  General appearance: Normal  Head including face: Normal  Eyes: conjunctiva and lids: Normal  Mouth: Lips, teeth, gums: Normal  Neck: Normal  Skin: Scalp and body hair: See below    Slightly decreased density through vertex scalp. Scalp normal without erythema or scaling. Negative hair pull.   1+ comedones on the alfonzo    ASSESSMENT/PLAN:     Androgenetic alopecia - doing great on lucila. Improved from photographs from 2021; stable from photographs from 2022.   --Continue spironolactone 100 mg daily  --Updated photographs taken for reference    Mild acne vulgaris  --continue tretinoin cream          Follow-up: yearly/PRN sooner  CC:   Scribed By: Josephine Fang, MS, [place]      Again, thank you for allowing me to participate in the care of your patient.        Sincerely,        Josephine Fang PA-C

## 2024-07-16 NOTE — LETTER
"  2024      RE: Ismael Pepper  [address]  [facility] [address]     Dear Colleague,    Thank you for referring your patient, Ismael Pepper, to the [facility] [place]. Please see a copy of my visit note below.    PATIENT:  Ismael Pepper  :  2005  CELESTINA:  2024  Medical Nutrition Therapy    GOALS  Try increasing protein intake in the morning try a grab fast protein source  Premier Protein or Fairlife protein shake  Greek yogurt (Chobani or Oikos for example)  [place] pancake mix or frozen pancakes  Try to have something in the morning before work even when you don't feel hungry         Nutrition Reassessment  Ismael is a 19 year old year old female who presents to Pediatric Weight Management Clinic with class 1 obesity and prediabetes. Ismael was referred by ANNE Gutierrez, CNP for nutrition education and counseling, accompanied by mother.    Anthropometrics  Wt Readings from Last 4 Encounters:   24 88.5 kg (195 lb 1.7 oz)   24 89 kg (196 lb 3.4 oz)   24 91.8 kg (202 lb 6.1 oz) (98%, Z= 1.98)*   24 91.8 kg (202 lb 6.1 oz) (98%, Z= 1.98)*     * Growth percentiles are based on [facility] (Girls, 2-20 Years) data.     Ht Readings from Last 2 Encounters:   24 1.68 m (5' 6.14\")   24 1.68 m (5' 6.14\")     Estimated body mass index is 31.36 kg/m  as calculated from the following:    Height as of this encounter: 1.68 m (5' 6.14\").    Weight as of this encounter: 88.5 kg (195 lb 1.7 oz).    Nutrition History  Ismael stopped taking the zepbound in March/April, did not like the side effects while she was taking the medication.  Has been taking her Metformin in the morning with an alarm and pill organzer.    Tring a bunch of sparkling frank - likes tj chico and choosing over sprite    Workin at a  M-F (8am-5pm)    Mom thinks she's is smaller portions and not eating as much as she used to eat     Has been waking up without " an appetite and could go into the afternoon. - will make a green tea if she doesn't feel hungry      Nutritional Intakes  Breakfast: 2-3 pancakes, slice of white bread with jelly (strawberry), on the weekends will go out to eat with Dad and Sister  Lunch: eggs, ham, chobani yogurt, water, grilled chicken w/ cauliflower rice, sometimes a fruit (grapes, melons)  Dinner: w/ mom steak, fries, chicken wraps, hamburger, tacos (3) corn tortilla, chicken with salad  HS Snack: sometimes  (3 days/week),  constantine chili tortilla roll up chips, chocolate covered bananas  Beverages: water, sparkling water, green tea    Dining Out  Frequency: 1-2 times per week. Choices include:  Yum:  Chicken sandwich  Crunchy Greenlandic toast    Activity  [place] ([place]), slowly building up to running - 4/5 days/week  Waiting for a jump rope to come in the mail   Just got a hoola hoop and has been trying to get into that    Previous Goals & Progress  Set alarm for morning metformin at 800 am.- met  Try the  Mitchell's Sparkling Auguste with no added sugar/sweetener. - met  In the morning, try to have some sort of protein: boiled eggs with fruit, Greek yogurt or cottage cheese with fruit, small handful of nuts with fruit, avocado toast with everything bagel seasoning +/- egg - not met, working on adding more protein with meals  Try to increase physical activity. Go for walks or use your stair stepper, biking etc. Start with 3 days per week. - ongoing goal      Medications/Vitamins/Minerals    Current Outpatient Medications:      ferrous sulfate (FEROSUL) 325 (65 Fe) MG tablet, Take 1 tablet (325 mg) by mouth daily (with breakfast), Disp: 30 tablet, Rfl: 11     metFORMIN (GLUCOPHAGE XR) 500 MG 24 hr tablet, Take 1 tablet (500 mg) by mouth 3 times daily (with meals), Disp: 270 tablet, Rfl: 0     phentermine 30 MG capsule, Take 1 capsule (30 mg) by mouth every morning, Disp: 30 capsule, Rfl: 2     spironolactone (ALDACTONE) 50 MG  tablet, TAKE 2 TABLETS BY MOUTH DAILY, Disp: 180 tablet, Rfl: 1     topiramate (TOPAMAX) 100 MG tablet, Take 1 tablet (100 mg) by mouth at bedtime for 90 days, Disp: 90 tablet, Rfl: 0     tretinoin (RETIN-A) 0.05 % external cream, Apply small amount to acne prone areas on forehead at bedtime as directed, Disp: 45 g, Rfl: 1     triamcinolone (KENALOG) 0.1 % external ointment, Apply topically 2 times daily To rashes on elbows until resolved, Disp: 30 g, Rfl: 3     vitamin D3 (CHOLECALCIFEROL) 1.25 MG (51639 UT) capsule, Take 1 capsule (50,000 Units) by mouth every 7 days, Disp: 12 capsule, Rfl: 0    Nutrition-Related Labs  5/16/24  Hmg A1c 5.2 WNL (down from 6.2 on 8/1/23)    Nutrition Diagnosis  Obesity related to excessive energy intake as evidenced by BMI/age >95th %ile    Interventions & Education  Provided written and verbal education on the following:    Healthy lunchs  Healthy snacks  Increasing protein    Monitoring/Evaluation  Will continue to monitor progress towards goals and provide education in Pediatric Weight Management.    Spent 15 minutes in consult with patient & mother.       Shannon Kyle MS, RD, LD  Pediatric Clinical Dietitian  Phone: [phone]  Fax: [phone]      Again, thank you for allowing me to participate in the care of your patient.      Sincerely,    Radha Kyle RD

## 2024-07-24 ENCOUNTER — OFFICE VISIT (OUTPATIENT)
Dept: HEMATOLOGY | Facility: CLINIC | Age: 19
End: 2024-07-24
Attending: INTERNAL MEDICINE
Payer: COMMERCIAL

## 2024-07-24 VITALS
OXYGEN SATURATION: 98 % | TEMPERATURE: 98.1 F | HEART RATE: 89 BPM | HEIGHT: 66 IN | BODY MASS INDEX: 31.74 KG/M2 | DIASTOLIC BLOOD PRESSURE: 82 MMHG | WEIGHT: 197.5 LBS | SYSTOLIC BLOOD PRESSURE: 138 MMHG

## 2024-07-24 DIAGNOSIS — D50.0 IRON DEFICIENCY ANEMIA DUE TO CHRONIC BLOOD LOSS: ICD-10-CM

## 2024-07-24 DIAGNOSIS — R04.0 EPISTAXIS: ICD-10-CM

## 2024-07-24 DIAGNOSIS — N92.0 MENORRHAGIA WITH REGULAR CYCLE: Primary | ICD-10-CM

## 2024-07-24 DIAGNOSIS — R79.1 LOW RISTOCETIN COFACTOR ACTIVITY: ICD-10-CM

## 2024-07-24 LAB
APTT PPP: 28 SECONDS (ref 22–38)
BASOPHILS # BLD AUTO: 0.1 10E3/UL (ref 0–0.2)
BASOPHILS NFR BLD AUTO: 1 %
CRP SERPL-MCNC: <3 MG/L
EOSINOPHIL # BLD AUTO: 0.5 10E3/UL (ref 0–0.7)
EOSINOPHIL NFR BLD AUTO: 7 %
ERYTHROCYTE [DISTWIDTH] IN BLOOD BY AUTOMATED COUNT: 13 % (ref 10–15)
ERYTHROCYTE [SEDIMENTATION RATE] IN BLOOD BY WESTERGREN METHOD: 6 MM/HR (ref 0–20)
FERRITIN SERPL-MCNC: 48 NG/ML (ref 6–175)
HCT VFR BLD AUTO: 41.8 % (ref 35–47)
HGB BLD-MCNC: 14.4 G/DL (ref 11.7–15.7)
IMM GRANULOCYTES # BLD: 0 10E3/UL
IMM GRANULOCYTES NFR BLD: 1 %
INR PPP: 0.95 (ref 0.85–1.15)
IRON BINDING CAPACITY (ROCHE): 289 UG/DL (ref 240–430)
IRON SATN MFR SERPL: 20 % (ref 15–46)
IRON SERPL-MCNC: 57 UG/DL (ref 37–145)
LYMPHOCYTES # BLD AUTO: 2.3 10E3/UL (ref 0.8–5.3)
LYMPHOCYTES NFR BLD AUTO: 35 %
MCH RBC QN AUTO: 29.9 PG (ref 26.5–33)
MCHC RBC AUTO-ENTMCNC: 34.4 G/DL (ref 31.5–36.5)
MCV RBC AUTO: 87 FL (ref 78–100)
MONOCYTES # BLD AUTO: 0.6 10E3/UL (ref 0–1.3)
MONOCYTES NFR BLD AUTO: 9 %
NEUTROPHILS # BLD AUTO: 3.1 10E3/UL (ref 1.6–8.3)
NEUTROPHILS NFR BLD AUTO: 47 %
NRBC # BLD AUTO: 0 10E3/UL
NRBC BLD AUTO-RTO: 0 /100
PLATELET # BLD AUTO: 244 10E3/UL (ref 150–450)
RBC # BLD AUTO: 4.81 10E6/UL (ref 3.8–5.2)
RETICS # AUTO: 0.07 10E6/UL (ref 0.03–0.1)
RETICS/RBC NFR AUTO: 1.5 % (ref 0.5–2)
WBC # BLD AUTO: 6.5 10E3/UL (ref 4–11)

## 2024-07-24 PROCEDURE — 99417 PROLNG OP E/M EACH 15 MIN: CPT | Performed by: INTERNAL MEDICINE

## 2024-07-24 PROCEDURE — 83520 IMMUNOASSAY QUANT NOS NONAB: CPT | Performed by: INTERNAL MEDICINE

## 2024-07-24 PROCEDURE — 85245 CLOT FACTOR VIII VW RISTOCTN: CPT | Performed by: INTERNAL MEDICINE

## 2024-07-24 PROCEDURE — 85652 RBC SED RATE AUTOMATED: CPT | Performed by: INTERNAL MEDICINE

## 2024-07-24 PROCEDURE — 85246 CLOT FACTOR VIII VW ANTIGEN: CPT | Performed by: INTERNAL MEDICINE

## 2024-07-24 PROCEDURE — G0463 HOSPITAL OUTPT CLINIC VISIT: HCPCS | Performed by: INTERNAL MEDICINE

## 2024-07-24 PROCEDURE — 85390 FIBRINOLYSINS SCREEN I&R: CPT | Mod: 26 | Performed by: PATHOLOGY

## 2024-07-24 PROCEDURE — 82728 ASSAY OF FERRITIN: CPT | Performed by: INTERNAL MEDICINE

## 2024-07-24 PROCEDURE — 83550 IRON BINDING TEST: CPT | Performed by: INTERNAL MEDICINE

## 2024-07-24 PROCEDURE — 85240 CLOT FACTOR VIII AHG 1 STAGE: CPT | Performed by: INTERNAL MEDICINE

## 2024-07-24 PROCEDURE — 99205 OFFICE O/P NEW HI 60 MIN: CPT | Performed by: INTERNAL MEDICINE

## 2024-07-24 PROCEDURE — 86140 C-REACTIVE PROTEIN: CPT | Performed by: INTERNAL MEDICINE

## 2024-07-24 PROCEDURE — 85730 THROMBOPLASTIN TIME PARTIAL: CPT | Performed by: INTERNAL MEDICINE

## 2024-07-24 PROCEDURE — 85025 COMPLETE CBC W/AUTO DIFF WBC: CPT | Performed by: INTERNAL MEDICINE

## 2024-07-24 PROCEDURE — 85045 AUTOMATED RETICULOCYTE COUNT: CPT | Performed by: INTERNAL MEDICINE

## 2024-07-24 PROCEDURE — 36415 COLL VENOUS BLD VENIPUNCTURE: CPT | Performed by: INTERNAL MEDICINE

## 2024-07-24 PROCEDURE — 85610 PROTHROMBIN TIME: CPT | Performed by: INTERNAL MEDICINE

## 2024-07-24 RX ORDER — TRANEXAMIC ACID 650 MG/1
1300 TABLET ORAL 2 TIMES DAILY PRN
Qty: 28 TABLET | Refills: 0 | Status: SHIPPED | OUTPATIENT
Start: 2024-07-24 | End: 2024-07-31

## 2024-07-24 NOTE — PROGRESS NOTES
[facility]  [address] [place] [id number]  Phone: [phone], Fax: [phone]    Outpatient Visit Note:    Patient: Ismael Pepper  MRN: [id number]  : 2005  CELESTINA: 2024    Reason for Consultation:  Ismael Pepper is a referred by ANNE Regan CNP for evaluation and treatment of heavy menstrual bleeding, bruising and low ristocetin cofactor activity on VWF screening.    Assessment:  In summary, Ismael Pepper is a 19 year old woman with polycystic ovarian syndrome (PCOS) and history of iron deficiency anemia on iron supplementation who presents to clinic due to low ristocetin cofactor activity.     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 VWF is the most common hereditary blood-clotting disorder in humans. VWF arises from a deficiency in the quality or quantity of von Willebrand factor (VWF), a multimeric protein that is required for platelet adhesion. The three forms of VWD are hereditary, acquired, and pseudo or platelet type. The three types of hereditary VWD are VWD type 1, VWD type 2, and VWD type 3. May with VWF have minor issues with bleeding, however trouble may, however, arise in some patients in the form of bleeding following surgery (including dental procedures), noticeable easy bruising, or menorrhagia (heavy menstrual periods). The minority of cases of type 1 may present with severe hemorrhagic symptoms.    During our visit, Ismael's bleeding assessment history was scored a 6 using the ISTH bleeding assessment score (points for nosebleeding, iron supplement for heavy menstrual bleeding, hematoma, cutaneous bruising). This is notable that  in female patients a score of  <6 results in lower pre-test probabiliy of VWF, however in Ismael's case- she has not had any surgeries, dental work or had pregnancy.  Therefore her BAT may be lower due to lack of challenges.     In 2021 the American Society of Hematology (NICOLE), the International Society on Thrombosis and Haemostasis (ISTH), the National Hemophilia Foundation (NHF- now National Bleeding Disorders Foundation), and the World Federation of Hemophilia (WFH) updated the diagnostic guideline for VWF. Pertinent to Ismael's case is several updates.     First, the guidelines suggests newer assays that measure the platelet-binding activity of VWF (eg, VWF:GPIbM, VWF:GPIbR) over the VWF ristocetin cofactor assay (VWF:RCo) (automated or nonautomated assay) for the diagnosis of VWD (recommendation 4)    Second, the guidelines suggest  a VWF level of <0.30 IU/mL regardless of bleeding, and for patients with abnormal bleeding, a VWF level of <0.50 IU/mL to confirm the diagnosis of type 1 VWD. At present Ismael has activity of 51%--- so she has borderline levels.     We discussed that after visit today we will send off VWF:GP1bM testing along with collagen biding testing. Furthermore, we will hold off on doing DDAVP stimulation until we have better idea if she is type 1 or type 2.     At present, for her nosebleeding AND her heavy menstrual bleeding- I would recommend tranexamic acid. We discussed that Lysteda and amicar stablize and prevent blood clots from breaking down. Therefore, she can START 1300 mg (two tablets) twice  a day on days she has nosebleeding OR on the heaviest days of her period.       Plan:  1. Majority of today's visit was spent counseling the patient regarding diagnosis and management of VWF  2.   Orders Placed This Encounter   Procedures    Factor 8 assay    Von Willebrand antigen    von Willebrand Factor Activity    von Willebrand Interpretation    VWF Activity with reflex to Ristocetin Cofactor Activity    Ristocetin cofactor    VWF Collagen III Binding    Other Laboratory; [facility]; 1280 VWF Collagen IV binding (Laboratory Miscellaneous Order)    Other  Laboratory; [facility]; 1070 VWF GPIbM activity (Laboratory Miscellaneous Order)    Ferritin    Iron and iron binding capacity    Erythrocyte sedimentation rate auto    CRP inflammation    Partial thromboplastin time    INR    Reticulocyte count    CBC with platelets and differential    CBC with Platelets & Differential     3. Tranexamic acid 1300 mg BID PRN for nosebleeding or heavy menstrual bleeding.  4. Anticipate based on above patient will need DDAVP stimulation testing and potential genetics counseling and testing (has three siblings)    The patient is given our center's contact information and is instructed to call if she should have any further questions or concerns.  Otherwise, we will plan on seeing her back Follow up with Provider - 3 months .      Oliva Bueno, nurse clinician, is assigned to provide patient care coordination and education.     Patient understands and agrees with the above plan and recommendation.    Independent interpretation of a test performed by another physician/other qualified health care professional (not separately reported) - VWF testing and assessment  Ordering of each unique test  Prescription drug management  I spent a total of 75 minutes on the day of the visit.   Time spent by me doing chart review, history and exam, documentation and further activities per the note    Sandee Mcdonald MD/PhD   of Medicine  Division of Hematology, Oncology and Transplantation    ----------------------------------------------------------------------------------------------------------------------  History of Present Illness:  Ismael Pepper is a 19 year old woman with polycystic ovarian syndrome (PCOS) and history of iron deficiency anemia on iron supplementation who presents to clinic due to low ristocetin cofactor activity.     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     Was started on oral iron due to  anemia. Had episodes of dizziness. Used to chew ice- stopped.     Had tried doing a GLP-1 inhibitor- but was getting lobo to dime sized bruise at the site.     Dental--- bleeds when she flosses. Not had any wisdom teeth removal. Did not bleed with removal of baby teeth.     Nosebleeds: start out of now where  or with nose bleeding will be steady flow. Will not end until a big bloody booger comes out. Last about 5 minutes or so. Would start bleeding again. No trips to the ER for bleeding. Seems to be worse at winter. Mom thinks this happens more in the summer.     Menstrual cycles: stated at age 10. Periods were irregular ~4 years ago (~ every 6 months). Started metformin and that has help with them being regular. Period is every 28 days. Bleeding pattern is 4-5 days. Period slow down around day 3. Uses pads- Always Flex- regular. Will change every 3-4 hours. Overnight will have feel blood gushing. Has some small clots. Has not been pregnant. Period is due to start ~24 (last started 24)    No surgical history. No trauma history.     Mom is here- daughter serving  ([employer]). Periods last 7 days or more. On heavier days will have some flooding or gushing. No sure if due to menopause.  Had  with oldest brother.      Past Medical History:  History reviewed. No pertinent past medical history.    Past Surgical History:  History reviewed. No pertinent surgical history.    Medications:  Current Outpatient Medications   Medication Sig Dispense Refill    ferrous sulfate (FEROSUL) 325 (65 Fe) MG tablet Take 1 tablet (325 mg) by mouth daily (with breakfast) 30 tablet 11    metFORMIN (GLUCOPHAGE XR) 500 MG 24 hr tablet Take 1 tablet (500 mg) by mouth 3 times daily (with meals) 270 tablet 0    phentermine 30 MG capsule Take 1 capsule (30 mg) by mouth every morning 30 capsule 2    spironolactone (ALDACTONE) 50 MG tablet Take 2 tablets by mouth daily 180 tablet 3    topiramate (TOPAMAX) 100 MG tablet  "Take 1 tablet (100 mg) by mouth at bedtime for 90 days 90 tablet 0    tretinoin (RETIN-A) 0.05 % external cream Apply small amount to acne prone areas on forehead at bedtime as directed 45 g 11    triamcinolone (KENALOG) 0.1 % external ointment Apply topically 2 times daily To rashes on elbows until resolved (Patient not taking: Reported on 2024) 30 g 3    vitamin D3 (CHOLECALCIFEROL) 1.25 MG (06549 UT) capsule Take 1 capsule (50,000 Units) by mouth every 7 days 12 capsule 0        Allergies:  Allergies   Allergen Reactions    Seasonal Allergies        ROS:  Remainder of a comprehensive 14 point ROS is negative unless noted above.    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  Denies any tobacco use. No significant alcohol use. Denies any illicit drug use. Lives at home. Mom, dad, younger sister and brother.       Family History:  Sister age 13-  sometimes have heavier periods.   Brother age 17- history of nosebleeds  Brother age 20+  Mother- history of a - no history of post-partum bleeding    Objectives:  Vitals: /82 (BP Location: Right arm, Patient Position: Sitting, Cuff Size: Adult Large)   Pulse 89   Temp 98.1  F (36.7  C) (Oral)   Ht 1.68 m (5' 6.14\")   Wt 89.6 kg (197 lb 8 oz)   SpO2 98%   BMI 31.74 kg/m    Constitutional: Appears well, no distress  HEENT: Pupils equal and reactive to light. No scleral icterus or hemorrhage. Nares without evidence of telangiectasia. Mucous membranes moist with no wet purpura. Dentition overall ok with no signs of decay. No pharyngeal exudates. No lymphadenopathy, no thyromeagaly  CV: regular rate and rhythm, no murmurs  Respiratory: clear  GI: abdomen soft, nontender, without guarding or rebound. No hepatomeagaly. No splenomegaly.   Mus/Skele: no edema  Skin: no petechiae, no ecchymosis.  Neuro: CN II-XII intact. Normal gait. AOx3  Heme/Lymph: no supraclavicular, axillary or umbilical adenopathy.     Labs:  WBC Count "   Date Value Ref Range Status   12/13/2021 9.3 4.0 - 11.0 10e3/uL Final     Hemoglobin   Date Value Ref Range Status   12/13/2021 13.0 11.7 - 15.7 g/dL Final   05/01/2019 13.4 12.0 - 16.0 g/dL Final     Hematocrit   Date Value Ref Range Status   12/13/2021 39.2 35.0 - 47.0 % Final     Platelet Count   Date Value Ref Range Status   12/13/2021 310 150 - 450 10e3/uL Final     von Willebrand Factor Activity   Date Value Ref Range Status   04/23/2024 51 50 - 180 % Final     Factor 8 Assay   Date Value Ref Range Status   04/23/2024 71 55 - 200 % Final     von Willebrand Factor Multimers   Date Value Ref Range Status   04/23/2024 See Note  Final     Comment:       von Willebrand factor multimeric analysis shows a normal   multimeric distribution.    Multimeric analysis is a qualitative test that cannot be   used alone for the diagnosis or subtyping of von Willebrand   disease. This result should be correlated with quantitative   results from vWF antigen, vWF activity, factor VIII   activity testing, and clinical information to exclude   subtypes of von Willebrand disease with a normal multimeric   distribution. Additional information regarding diagnosis   and subtyping of von Willebrand disease is available at   [email or web address].[email or web address].  INTERPRETIVE INFORMATION: von Willebrand Factor Multimers    This test was developed and its performance characteristics   determined by [facility]. It has not been cleared or   approved by the US Food and Drug Administration. This test   was performed in a CLIA certified laboratory and is   intended for clinical purposes.  Performed By: [facility]  [address] [place] [id number]  : Dylon Scruggs MD, PhD  CLIA Number: [id number]     Ristocetin Cofactor Activity   Date Value Ref Range Status   04/23/2024 39 (L) 51 - 215 % Final     Comment:     REFERENCE INTERVAL: von Willebrand Factor, Activity (RCF)    Access complete set of age- and/or  gender-specific   reference intervals for this test in the [facility]   Test Directory ([facility].com).  Performed By: [facility]  [address] [place] [id number]  : Dylon Scruggs MD, PhD  CLIA Number: [id number]       Imaging:  No image results found.

## 2024-07-24 NOTE — PATIENT INSTRUCTIONS
[facility]  [facility]  [address] [place] [address] [address], [place] [id number]  Main: [phone], Fax: [phone]    It was a pleasure seeing you today.  Thank you for allowing us to be involved in your care.  Please let us know if there is anything else we can do for you, so that we can be sure you are leaving completely satisfied with your care experience.    Labs today.  Our lab is located within our clinic space.  We will communicate these to you by authorGEN. With your permission we may leave a detailed voicemail, please see below for our contact information should you have any questions about your results. Also, please note that some lab results may take a week or so to get back. Feel free to call or message if you have not heard back from us within 7-10 days.     For your upcoming surgery or procedure, the plan is: TBD    For dental procedures, colonoscopy or endoscopy biopsies, heavy periods or ongoing nosebleeds, please call the center.  We may consider using  Amicar (aminocaproic acid) or Lysteda (transexamic acid).      If you would like further information about your bleeding disorder, the following websites may be of help:  The National Bleeding Disorders Foundation (includes HANDI link for educational resources)   www.hemophilia.org  The World Federation of Hemophilia (includes Global Treatment Mathews Directory)   www.wfh.org  The Foundation for Women and Girls with Bleeding Disorders        www.fwgbd.org     If you have emergency needs in the evening or weekend, our emergency phone number on [place] ([facility]) is [phone].  If  you are with an outside provider, have them call the doctor consult line [phone] and ask for the hematologist on call or Dr. Sandee Mcdonald.    We would like a provider on our team to see you at least annually for optimal care and to allow us to continue to prescribe for you.  Our physician assistants  that are specialized in bleeding and clotting disorders that you may be able to see more readily.    Return to clinic in  3 months.  Please schedule return appointment with Dr. Mcdonald.    Your nurse clinician is Oliva Bueno,  RN, MSN, [phone].  If she is unavailable and you have immediate concerns, please call [phone] and ask for a nurse.

## 2024-07-26 LAB
FACT VIII ACT/NOR PPP: 83 % (ref 55–200)
VWF AG ACT/NOR PPP IA: 63 % (ref 50–200)
VWF:AC ACT/NOR PPP IA: 51 % (ref 50–180)

## 2024-07-29 LAB — VWF:RCO ACT/NOR PPP PL AGG: NORMAL %

## 2024-07-31 LAB
SCANNED LAB RESULT: ABNORMAL
SCANNED LAB RESULT: NORMAL
VWF:RCO ACT/NOR PPP PL AGG: 52 %

## 2024-08-02 NOTE — PROGRESS NOTES
Majority of VWF labs back.     GP1bM activity low with low VWF:ag ratio (0.67). Normal VWF CBIII and normal Krystle    Previous sample had a  VWF:RCo to VWF:Ag ratio on the lower side at 0.64, but it is higher on the current sample.     Differential low VWF v Type 1 v Type 2M      Action Plan  - repeat studies on day 2-3 menstrual cycle  - ABO typing   - genetic testing      Sandee Mcdonald MD/PhD   of Medicine  Division of Hematology, Oncology and Transplantation

## 2024-08-05 ENCOUNTER — MYC MEDICAL ADVICE (OUTPATIENT)
Dept: HEMATOLOGY | Facility: CLINIC | Age: 19
End: 2024-08-05
Payer: COMMERCIAL

## 2024-08-15 LAB — SCANNED LAB RESULT: NORMAL

## 2024-08-16 LAB — VON WILLEBRAND EVAL PPP-IMP: NORMAL

## 2024-08-21 NOTE — TELEPHONE ENCOUNTER
[id number]  Ismael Pepper  19 year old female  [facility] Diagnosis: Heavy Menstrual Bleeding/low risto  [facility] Provider: Harry MONTEMAYOR called Ismael to discuss lab results and plan moving forward. She did not answer. A detailed Frameri message was sent and call back number provided.     Oliva Bueno  MSN, RN, N  [facility]  Office: [phone]  Fax: [phone]    Addendum  RN called Ismael again. She did not answer. Detailed voicemail left and call back requested.     Oliva ALVARADO, RN, N  [facility]  Office: [phone]  Fax: [phone]

## 2024-09-09 NOTE — NURSING NOTE
"[facility] [[id number]]  Chief Complaint   Patient presents with     Consult     weight management     Initial BP (!) 120/91   Pulse 91   Ht 5' 5.98\" (167.6 cm)   Wt 208 lb 1.8 oz (94.4 kg)   BMI 33.61 kg/m   Estimated body mass index is 33.61 kg/m  as calculated from the following:    Height as of this encounter: 5' 5.98\" (167.6 cm).    Weight as of this encounter: 208 lb 1.8 oz (94.4 kg).  Medication Reconciliation: complete   Stefany Armstrong LPN      "
OT evaluate and treat
[[id number]]

## 2024-09-16 NOTE — TELEPHONE ENCOUNTER
----- Message from Lianet Avila MD sent at 7/1/2019 12:39 PM CDT -----  Shahid Blake,    Please let Ismael's parent know that her labs from her visit on 6/21/2019 were normal. Her clinical picture (supported by labs from PCP) suggest PCOS. I already discussed this diagnosis with them at that visit. No change in plan we discussed then.    THanks,    Lianet  
Called with  and spoke with mother.  Discussed message from Dr. Avila below.  Mom had no questions at this time.    Gave mom number to Moriah to call to arrange follow up appointments.  Mom will call to schedule.    She had no other questions at this time.  
440

## 2025-03-15 ENCOUNTER — HEALTH MAINTENANCE LETTER (OUTPATIENT)
Age: 20
End: 2025-03-15